# Patient Record
Sex: FEMALE | Race: WHITE | NOT HISPANIC OR LATINO | URBAN - METROPOLITAN AREA
[De-identification: names, ages, dates, MRNs, and addresses within clinical notes are randomized per-mention and may not be internally consistent; named-entity substitution may affect disease eponyms.]

---

## 2023-03-21 PROBLEM — C50.919 MALIGNANT NEOPLASM OF BREAST (MULTI): Status: ACTIVE | Noted: 2023-03-21

## 2023-03-21 PROBLEM — M15.9 GENERALIZED OSTEOARTHRITIS: Status: ACTIVE | Noted: 2023-03-21

## 2023-03-21 PROBLEM — F41.8 SITUATIONAL ANXIETY: Status: ACTIVE | Noted: 2023-03-21

## 2023-03-21 PROBLEM — B00.1 RECURRENT COLD SORES: Status: ACTIVE | Noted: 2023-03-21

## 2023-03-21 PROBLEM — K58.9 IRRITABLE BOWEL SYNDROME: Status: ACTIVE | Noted: 2023-03-21

## 2023-03-21 PROBLEM — J40 BRONCHITIS: Status: ACTIVE | Noted: 2023-03-21

## 2023-03-21 PROBLEM — M62.830 SPASM OF THORACIC BACK MUSCLE: Status: ACTIVE | Noted: 2023-03-21

## 2023-03-21 PROBLEM — R60.9 PERIPHERAL EDEMA: Status: ACTIVE | Noted: 2023-03-21

## 2023-03-21 PROBLEM — E55.9 VITAMIN D DEFICIENCY: Status: ACTIVE | Noted: 2023-03-21

## 2023-03-21 PROBLEM — E78.5 HLD (HYPERLIPIDEMIA): Status: ACTIVE | Noted: 2023-03-21

## 2023-03-21 PROBLEM — G43.809 HEADACHE, VARIANT MIGRAINE: Status: ACTIVE | Noted: 2023-03-21

## 2023-03-21 PROBLEM — R60.0 PERIPHERAL EDEMA: Status: ACTIVE | Noted: 2023-03-21

## 2023-03-21 PROBLEM — M54.50 LOW BACK PAIN: Status: ACTIVE | Noted: 2023-03-21

## 2023-03-21 PROBLEM — R00.2 HEART PALPITATIONS: Status: ACTIVE | Noted: 2023-03-21

## 2023-03-21 PROBLEM — R93.1 ELEVATED CORONARY ARTERY CALCIUM SCORE: Status: ACTIVE | Noted: 2023-03-21

## 2023-03-21 PROBLEM — L25.9 CONTACT DERMATITIS: Status: ACTIVE | Noted: 2023-03-21

## 2023-03-21 PROBLEM — L50.9 URTICARIA: Status: ACTIVE | Noted: 2023-03-21

## 2023-03-21 PROBLEM — I25.10 CAD (CORONARY ARTERY DISEASE): Status: ACTIVE | Noted: 2023-03-21

## 2023-03-21 RX ORDER — CETIRIZINE HYDROCHLORIDE 10 MG/1
10 TABLET ORAL DAILY PRN
COMMUNITY
Start: 2017-09-06 | End: 2023-04-06 | Stop reason: SDUPTHER

## 2023-03-21 RX ORDER — DIAZEPAM 5 MG/1
0.5 TABLET ORAL 2 TIMES DAILY PRN
COMMUNITY
Start: 2022-01-05 | End: 2023-04-06 | Stop reason: SDUPTHER

## 2023-03-21 RX ORDER — CHLORDIAZEPOXIDE HYDROCHLORIDE AND CLIDINIUM BROMIDE 5; 2.5 MG/1; MG/1
1-2 CAPSULE ORAL EVERY 6 HOURS PRN
COMMUNITY
Start: 2014-06-26

## 2023-03-21 RX ORDER — CYCLOBENZAPRINE HCL 10 MG
10 TABLET ORAL AS NEEDED
COMMUNITY
Start: 2021-09-22 | End: 2023-11-06 | Stop reason: SDUPTHER

## 2023-03-21 RX ORDER — ROSUVASTATIN CALCIUM 40 MG/1
1 TABLET, COATED ORAL DAILY
COMMUNITY
Start: 2020-12-03 | End: 2024-04-30 | Stop reason: SDUPTHER

## 2023-03-21 RX ORDER — ACYCLOVIR 400 MG/1
1 TABLET ORAL DAILY
COMMUNITY
Start: 2017-02-28 | End: 2023-07-31 | Stop reason: SDUPTHER

## 2023-03-21 RX ORDER — ASPIRIN 81 MG/1
1 TABLET ORAL DAILY
COMMUNITY
Start: 2021-02-09

## 2023-03-21 RX ORDER — DICLOFENAC SODIUM 75 MG/1
1 TABLET, DELAYED RELEASE ORAL DAILY
COMMUNITY
Start: 2016-03-04 | End: 2023-07-28 | Stop reason: SDUPTHER

## 2023-03-21 RX ORDER — OXYCODONE AND ACETAMINOPHEN 5; 325 MG/1; MG/1
1 TABLET ORAL EVERY 6 HOURS PRN
COMMUNITY
Start: 2018-02-19 | End: 2023-04-06 | Stop reason: ALTCHOICE

## 2023-03-21 RX ORDER — PREDNISONE 20 MG/1
TABLET ORAL
COMMUNITY
Start: 2020-10-08 | End: 2023-04-06 | Stop reason: SDUPTHER

## 2023-04-05 PROBLEM — M54.59 OTHER LOW BACK PAIN: Status: ACTIVE | Noted: 2023-04-05

## 2023-04-06 ENCOUNTER — OFFICE VISIT (OUTPATIENT)
Dept: PRIMARY CARE | Facility: CLINIC | Age: 68
End: 2023-04-06
Payer: MEDICARE

## 2023-04-06 ENCOUNTER — LAB (OUTPATIENT)
Dept: LAB | Facility: LAB | Age: 68
End: 2023-04-06
Payer: MEDICARE

## 2023-04-06 VITALS
WEIGHT: 151.4 LBS | HEART RATE: 84 BPM | OXYGEN SATURATION: 98 % | HEIGHT: 62 IN | SYSTOLIC BLOOD PRESSURE: 126 MMHG | BODY MASS INDEX: 27.86 KG/M2 | DIASTOLIC BLOOD PRESSURE: 80 MMHG

## 2023-04-06 DIAGNOSIS — Z00.00 ROUTINE GENERAL MEDICAL EXAMINATION AT A HEALTH CARE FACILITY: Primary | ICD-10-CM

## 2023-04-06 DIAGNOSIS — E55.9 VITAMIN D DEFICIENCY: ICD-10-CM

## 2023-04-06 DIAGNOSIS — Z13.0 SCREENING FOR DEFICIENCY ANEMIA: ICD-10-CM

## 2023-04-06 DIAGNOSIS — M15.9 GENERALIZED OSTEOARTHRITIS: ICD-10-CM

## 2023-04-06 DIAGNOSIS — Z13.6 ENCOUNTER FOR SCREENING FOR CARDIOVASCULAR DISORDERS: ICD-10-CM

## 2023-04-06 DIAGNOSIS — Z00.00 ROUTINE GENERAL MEDICAL EXAMINATION AT A HEALTH CARE FACILITY: ICD-10-CM

## 2023-04-06 DIAGNOSIS — C50.919 MALIGNANT NEOPLASM OF FEMALE BREAST, UNSPECIFIED ESTROGEN RECEPTOR STATUS, UNSPECIFIED LATERALITY, UNSPECIFIED SITE OF BREAST (MULTI): ICD-10-CM

## 2023-04-06 DIAGNOSIS — F41.8 SITUATIONAL ANXIETY: ICD-10-CM

## 2023-04-06 DIAGNOSIS — Z12.11 COLON CANCER SCREENING: ICD-10-CM

## 2023-04-06 DIAGNOSIS — F19.20 CONTROLLED DRUG DEPENDENCE (MULTI): ICD-10-CM

## 2023-04-06 DIAGNOSIS — Z13.29 SCREENING FOR THYROID DISORDER: ICD-10-CM

## 2023-04-06 DIAGNOSIS — I25.10 CORONARY ARTERY DISEASE, UNSPECIFIED VESSEL OR LESION TYPE, UNSPECIFIED WHETHER ANGINA PRESENT, UNSPECIFIED WHETHER NATIVE OR TRANSPLANTED HEART: ICD-10-CM

## 2023-04-06 DIAGNOSIS — E78.5 HYPERLIPIDEMIA, UNSPECIFIED HYPERLIPIDEMIA TYPE: ICD-10-CM

## 2023-04-06 PROBLEM — S93.409A SPRAIN OF ANKLE: Status: ACTIVE | Noted: 2023-04-06

## 2023-04-06 LAB
ALANINE AMINOTRANSFERASE (SGPT) (U/L) IN SER/PLAS: 58 U/L (ref 7–45)
ALBUMIN (G/DL) IN SER/PLAS: 4.1 G/DL (ref 3.4–5)
ALKALINE PHOSPHATASE (U/L) IN SER/PLAS: 83 U/L (ref 33–136)
AMPHETAMINE (PRESENCE) IN URINE BY SCREEN METHOD: ABNORMAL
ANION GAP IN SER/PLAS: 13 MMOL/L (ref 10–20)
APPEARANCE, URINE: CLEAR
ASPARTATE AMINOTRANSFERASE (SGOT) (U/L) IN SER/PLAS: 39 U/L (ref 9–39)
BARBITURATES PRESENCE IN URINE BY SCREEN METHOD: ABNORMAL
BENZODIAZEPINE (PRESENCE) IN URINE BY SCREEN METHOD: ABNORMAL
BILIRUBIN TOTAL (MG/DL) IN SER/PLAS: 0.5 MG/DL (ref 0–1.2)
BILIRUBIN, URINE: NEGATIVE
BLOOD, URINE: ABNORMAL
CALCIDIOL (25 OH VITAMIN D3) (NG/ML) IN SER/PLAS: 47 NG/ML
CALCIUM (MG/DL) IN SER/PLAS: 9.1 MG/DL (ref 8.6–10.6)
CANNABINOIDS IN URINE BY SCREEN METHOD: ABNORMAL
CARBON DIOXIDE, TOTAL (MMOL/L) IN SER/PLAS: 27 MMOL/L (ref 21–32)
CHLORIDE (MMOL/L) IN SER/PLAS: 108 MMOL/L (ref 98–107)
COBALAMIN (VITAMIN B12) (PG/ML) IN SER/PLAS: 268 PG/ML (ref 211–911)
COCAINE (PRESENCE) IN URINE BY SCREEN METHOD: ABNORMAL
COLOR, URINE: YELLOW
CREATININE (MG/DL) IN SER/PLAS: 0.61 MG/DL (ref 0.5–1.05)
DRUG SCREEN COMMENT URINE: ABNORMAL
ERYTHROCYTE DISTRIBUTION WIDTH (RATIO) BY AUTOMATED COUNT: 13.2 % (ref 11.5–14.5)
ERYTHROCYTE MEAN CORPUSCULAR HEMOGLOBIN CONCENTRATION (G/DL) BY AUTOMATED: 32.5 G/DL (ref 32–36)
ERYTHROCYTE MEAN CORPUSCULAR VOLUME (FL) BY AUTOMATED COUNT: 94 FL (ref 80–100)
ERYTHROCYTES (10*6/UL) IN BLOOD BY AUTOMATED COUNT: 4.54 X10E12/L (ref 4–5.2)
FENTANYL URINE: ABNORMAL
GFR FEMALE: >90 ML/MIN/1.73M2
GLUCOSE (MG/DL) IN SER/PLAS: 82 MG/DL (ref 74–99)
GLUCOSE, URINE: NEGATIVE MG/DL
HEMATOCRIT (%) IN BLOOD BY AUTOMATED COUNT: 42.5 % (ref 36–46)
HEMOGLOBIN (G/DL) IN BLOOD: 13.8 G/DL (ref 12–16)
KETONES, URINE: NEGATIVE MG/DL
LEUKOCYTE ESTERASE, URINE: NEGATIVE
LEUKOCYTES (10*3/UL) IN BLOOD BY AUTOMATED COUNT: 6 X10E9/L (ref 4.4–11.3)
METHADONE (PRESENCE) IN URINE BY SCREEN METHOD: ABNORMAL
NITRITE, URINE: NEGATIVE
NRBC (PER 100 WBCS) BY AUTOMATED COUNT: 0 /100 WBC (ref 0–0)
OPIATES (PRESENCE) IN URINE BY SCREEN METHOD: ABNORMAL
OXYCODONE (PRESENCE) IN URINE BY SCREEN METHOD: ABNORMAL
PH, URINE: 6 (ref 5–8)
PHENCYCLIDINE (PRESENCE) IN URINE BY SCREEN METHOD: ABNORMAL
PLATELETS (10*3/UL) IN BLOOD AUTOMATED COUNT: 187 X10E9/L (ref 150–450)
POTASSIUM (MMOL/L) IN SER/PLAS: 3.6 MMOL/L (ref 3.5–5.3)
PROTEIN TOTAL: 6.1 G/DL (ref 6.4–8.2)
PROTEIN, URINE: NEGATIVE MG/DL
RBC, URINE: 3 /HPF (ref 0–5)
SODIUM (MMOL/L) IN SER/PLAS: 144 MMOL/L (ref 136–145)
SPECIFIC GRAVITY, URINE: 1.01 (ref 1–1.03)
SQUAMOUS EPITHELIAL CELLS, URINE: <1 /HPF
THYROTROPIN (MIU/L) IN SER/PLAS BY DETECTION LIMIT <= 0.05 MIU/L: 1.91 MIU/L (ref 0.44–3.98)
UREA NITROGEN (MG/DL) IN SER/PLAS: 7 MG/DL (ref 6–23)
UROBILINOGEN, URINE: <2 MG/DL (ref 0–1.9)
WBC, URINE: 2 /HPF (ref 0–5)

## 2023-04-06 PROCEDURE — 81001 URINALYSIS AUTO W/SCOPE: CPT

## 2023-04-06 PROCEDURE — 36415 COLL VENOUS BLD VENIPUNCTURE: CPT

## 2023-04-06 PROCEDURE — 84443 ASSAY THYROID STIM HORMONE: CPT

## 2023-04-06 PROCEDURE — 1159F MED LIST DOCD IN RCRD: CPT | Performed by: STUDENT IN AN ORGANIZED HEALTH CARE EDUCATION/TRAINING PROGRAM

## 2023-04-06 PROCEDURE — 80346 BENZODIAZEPINES1-12: CPT

## 2023-04-06 PROCEDURE — 82607 VITAMIN B-12: CPT

## 2023-04-06 PROCEDURE — 80053 COMPREHEN METABOLIC PANEL: CPT

## 2023-04-06 PROCEDURE — 1170F FXNL STATUS ASSESSED: CPT | Performed by: STUDENT IN AN ORGANIZED HEALTH CARE EDUCATION/TRAINING PROGRAM

## 2023-04-06 PROCEDURE — 85027 COMPLETE CBC AUTOMATED: CPT

## 2023-04-06 PROCEDURE — 1160F RVW MEDS BY RX/DR IN RCRD: CPT | Performed by: STUDENT IN AN ORGANIZED HEALTH CARE EDUCATION/TRAINING PROGRAM

## 2023-04-06 PROCEDURE — 80307 DRUG TEST PRSMV CHEM ANLYZR: CPT

## 2023-04-06 PROCEDURE — 82306 VITAMIN D 25 HYDROXY: CPT

## 2023-04-06 PROCEDURE — G0439 PPPS, SUBSEQ VISIT: HCPCS | Performed by: STUDENT IN AN ORGANIZED HEALTH CARE EDUCATION/TRAINING PROGRAM

## 2023-04-06 RX ORDER — DIAZEPAM 5 MG/1
2.5 TABLET ORAL EVERY 12 HOURS PRN
Qty: 60 TABLET | Refills: 0 | Status: SHIPPED | OUTPATIENT
Start: 2023-04-06 | End: 2024-04-09

## 2023-04-06 RX ORDER — CETIRIZINE HYDROCHLORIDE 10 MG/1
10 TABLET ORAL DAILY
COMMUNITY

## 2023-04-06 RX ORDER — HYDROCODONE BITARTRATE AND ACETAMINOPHEN 5; 325 MG/1; MG/1
TABLET ORAL EVERY 6 HOURS
COMMUNITY
End: 2023-04-06 | Stop reason: ALTCHOICE

## 2023-04-06 RX ORDER — LIDOCAINE HYDROCHLORIDE 20 MG/ML
SOLUTION OROPHARYNGEAL EVERY 4 HOURS PRN
COMMUNITY
Start: 2016-03-21 | End: 2023-04-06 | Stop reason: ALTCHOICE

## 2023-04-06 RX ORDER — METHYLPREDNISOLONE 4 MG/1
TABLET ORAL
COMMUNITY
End: 2023-04-06 | Stop reason: ALTCHOICE

## 2023-04-06 RX ORDER — ACYCLOVIR 50 MG/G
OINTMENT TOPICAL
COMMUNITY
End: 2023-04-06 | Stop reason: ALTCHOICE

## 2023-04-06 RX ORDER — PREDNISONE 20 MG/1
20 TABLET ORAL DAILY
Qty: 21 TABLET | Refills: 1 | Status: SHIPPED | OUTPATIENT
Start: 2023-04-06 | End: 2023-04-06 | Stop reason: ALTCHOICE

## 2023-04-06 ASSESSMENT — ENCOUNTER SYMPTOMS
OCCASIONAL FEELINGS OF UNSTEADINESS: 0
LOSS OF SENSATION IN FEET: 0
DEPRESSION: 0

## 2023-04-06 ASSESSMENT — ACTIVITIES OF DAILY LIVING (ADL)
MANAGING_FINANCES: INDEPENDENT
DOING_HOUSEWORK: INDEPENDENT
BATHING: INDEPENDENT
DRESSING: INDEPENDENT
GROCERY_SHOPPING: INDEPENDENT
TAKING_MEDICATION: INDEPENDENT

## 2023-04-06 ASSESSMENT — PATIENT HEALTH QUESTIONNAIRE - PHQ9
2. FEELING DOWN, DEPRESSED OR HOPELESS: NOT AT ALL
SUM OF ALL RESPONSES TO PHQ9 QUESTIONS 1 AND 2: 0
1. LITTLE INTEREST OR PLEASURE IN DOING THINGS: NOT AT ALL

## 2023-04-06 NOTE — PATIENT INSTRUCTIONS
1.  Medicare wellness.  No concerns on exam.  Screening labs ordered today.  Up-to-date with mammogram.  We do advise on colon cancer screening Cologuard ordered today.  Defers on vaccinations at this time.    2.  Anxiety.  She rarely needs diazepam.  Patient agreement forms and drug screen ordered today.  OARRS report reviewed.  You have been prescribed a schedule 4 controlled substance, a benzodiazepine, for maintenance of anxiety. The longer term use of these medications for anxiety maintenance is both frowned upon and addicting. However in some cases it is needed. By law, you can be prescribed a 90 day supply with 1 refill. After that if refills are needed there will be a required office visit, there will be no exceptions. You will be drug tested on a regular basis. A prescriber report will be ran each time you need a refill. You may not use any illegal drugs, and may not get controlled medications prescribed from more than 1 doctor. If there are any abnormalities on your drug testing, and if any other physicians are found to have been prescribing you controlled substances, our office will no longer help with refills in the future. There will be no exceptions. It is your responsibility to take your medications properly, it is your responsibility to make office visits in advance when refills may be needed, and it is your responsibility to not take any illegal drugs, as well as any controlled medications from another doctor. If you fail in your responsibilities, our office will discontinue your medication with no questions and no exceptions.  If you feel that you are addicted to your medication and would like to improve yourself and cut back, you may contact the [place] Department of [facility], Their number [phone] toll-free. Your call is confidential. They will refer you to someone for help. You can call them from Monday through Friday between 8 a.m. and 5 p.m.

## 2023-04-06 NOTE — PROGRESS NOTES
"Subjective   Patient ID: Vida Jose is a 67 y.o. female who presents for Medicare Annual Wellness Visit Subsequent (She is not fasting.) and Med Refill.    HPI she is here for Medicare wellness visit.  Due for blood work.  Needs med refill.  She rarely uses diazepam.    Review of Systems  Constitutional: NO F, chills, or sweats  Eyes: no blurred vision or visual disturbance  ENT: no hearing loss, no congestion, no nasal discharge, no hoarseness and no sore throat.   Cardiovascular: no chest pain, no edema, no palps and no syncope.   Respiratory: no cough,no s.o.b. and no wheezing  Gastrointestinal: no abdominal pain, No C/D no N/V, no blood in stools  Genitourinary: no dysuria, no change in urinary frequency, no urinary hesitancy and no feelings of urinary urgency.   Musculoskeletal: no arthralgias,  no back pain and no myalgias.   Integumentary: no new skin lesions and no rashes.   Neurological: no difficulty walking, no headache, no limb weakness, no numbness and no tingling.   Psychiatric: Rare situational anxiety  Endocrine: no recent weight gain and no recent weight loss.   Hematologic/Lymphatic: no tendency for easy bruising and no swollen glands.  Objective   /80 (BP Location: Left arm, Patient Position: Sitting, BP Cuff Size: Adult)   Pulse 84   Ht 1.577 m (5' 2.09\")   Wt 68.7 kg (151 lb 6.4 oz)   SpO2 98%   BMI 27.61 kg/m²     Physical Exam  gen- a & o x 3, nad, pleasant  heent- eomi, perrla, ear canals patent, TM's non-erythematous, no fluid, frontal and maxillary sinus's nontender  neck- supple, nontender, no palpable or enlarged nodes, no thyromegaly  heart- rrr, no murmurs  lungs- cta b/l , no w/r/r  chest- symmetric, nontender  ab- soft, nontender, no palpable organomegaly, postive bowel sounds  ex's- no c/c/e  neuro- CNs 2-12 grossly intact, full sensation and strength in all extremities    Assessment/Plan     1.  Medicare wellness.  No concerns on exam.  Screening labs ordered " today.  Up-to-date with mammogram.  We do advise on colon cancer screening Cologuard ordered today.  Defers on vaccinations at this time.    2.  Anxiety.  She rarely needs diazepam.  Patient agreement forms and drug screen ordered today.  OARRS report reviewed.  You have been prescribed a schedule 4 controlled substance, a benzodiazepine, for maintenance of anxiety. The longer term use of these medications for anxiety maintenance is both frowned upon and addicting. However in some cases it is needed. By law, you can be prescribed a 90 day supply with 1 refill. After that if refills are needed there will be a required office visit, there will be no exceptions. You will be drug tested on a regular basis. A prescriber report will be ran each time you need a refill. You may not use any illegal drugs, and may not get controlled medications prescribed from more than 1 doctor. If there are any abnormalities on your drug testing, and if any other physicians are found to have been prescribing you controlled substances, our office will no longer help with refills in the future. There will be no exceptions. It is your responsibility to take your medications properly, it is your responsibility to make office visits in advance when refills may be needed, and it is your responsibility to not take any illegal drugs, as well as any controlled medications from another doctor. If you fail in your responsibilities, our office will discontinue your medication with no questions and no exceptions.  If you feel that you are addicted to your medication and would like to improve yourself and cut back, you may contact the [place] [facility], Their number [phone] toll-free. Your call is confidential. They will refer you to someone for help. You can call them from Monday through Friday between 8 a.m. and 5 p.m.

## 2023-04-10 ENCOUNTER — TELEPHONE (OUTPATIENT)
Dept: PRIMARY CARE | Facility: CLINIC | Age: 68
End: 2023-04-10
Payer: MEDICARE

## 2023-04-10 DIAGNOSIS — R21 RASH: Primary | ICD-10-CM

## 2023-04-10 RX ORDER — PREDNISONE 20 MG/1
TABLET ORAL
Qty: 18 TABLET | Refills: 0 | Status: SHIPPED | OUTPATIENT
Start: 2023-04-10 | End: 2023-05-16 | Stop reason: ALTCHOICE

## 2023-04-10 NOTE — TELEPHONE ENCOUNTER
Patient called requesting a refill on her prednisone 20mg. She is going out of town and needs it for a heat rash. If you can please send it to the pharmacy on file.

## 2023-04-11 LAB
7-AMINOCLONAZEPAM: <25 NG/ML
ALPHA-HYDROXYALPRAZOLAM: <25 NG/ML
ALPHA-HYDROXYMIDAZOLAM: <25 NG/ML
ALPRAZOLAM: <25 NG/ML
CHLORDIAZEPOXIDE: <25 NG/ML
CLONAZEPAM: <25 NG/ML
DIAZEPAM: <25 NG/ML
LORAZEPAM: <25 NG/ML
MIDAZOLAM: <25 NG/ML
NORDIAZEPAM: <25 NG/ML
OXAZEPAM: 111 NG/ML
TEMAZEPAM: <25 NG/ML

## 2023-04-11 NOTE — TELEPHONE ENCOUNTER
Patient called requesting a refill on her prednisone 20mg. She is going out of town and needs it for a heat rash. If you can please send it to the pharmacy on file. She was told she accidentally canceled her order. If you can please send it again to the pharmacy.

## 2023-05-16 ENCOUNTER — TELEPHONE (OUTPATIENT)
Dept: PRIMARY CARE | Facility: CLINIC | Age: 68
End: 2023-05-16

## 2023-05-16 ENCOUNTER — OFFICE VISIT (OUTPATIENT)
Dept: PRIMARY CARE | Facility: CLINIC | Age: 68
End: 2023-05-16
Payer: MEDICARE

## 2023-05-16 VITALS
OXYGEN SATURATION: 99 % | BODY MASS INDEX: 27.29 KG/M2 | WEIGHT: 149.6 LBS | DIASTOLIC BLOOD PRESSURE: 72 MMHG | HEART RATE: 81 BPM | SYSTOLIC BLOOD PRESSURE: 106 MMHG

## 2023-05-16 DIAGNOSIS — G43.909 MIGRAINE WITHOUT STATUS MIGRAINOSUS, NOT INTRACTABLE, UNSPECIFIED MIGRAINE TYPE: Primary | ICD-10-CM

## 2023-05-16 DIAGNOSIS — R21 RASH: ICD-10-CM

## 2023-05-16 DIAGNOSIS — G43.909 MIGRAINE WITHOUT STATUS MIGRAINOSUS, NOT INTRACTABLE, UNSPECIFIED MIGRAINE TYPE: ICD-10-CM

## 2023-05-16 PROBLEM — M79.671 PAIN IN RIGHT FOOT: Status: ACTIVE | Noted: 2018-01-18

## 2023-05-16 PROBLEM — Z98.890 S/P FOOT SURGERY: Status: ACTIVE | Noted: 2018-03-21

## 2023-05-16 PROBLEM — M21.629 TAILOR'S BUNION: Status: ACTIVE | Noted: 2017-03-02

## 2023-05-16 PROBLEM — C50.919 BREAST CA (MULTI): Status: ACTIVE | Noted: 2023-05-16

## 2023-05-16 PROBLEM — M54.9 BACK PAIN: Status: ACTIVE | Noted: 2023-05-16

## 2023-05-16 PROBLEM — M20.11 ACQUIRED HALLUX VALGUS OF RIGHT FOOT: Status: ACTIVE | Noted: 2017-03-02

## 2023-05-16 PROCEDURE — 1159F MED LIST DOCD IN RCRD: CPT | Performed by: STUDENT IN AN ORGANIZED HEALTH CARE EDUCATION/TRAINING PROGRAM

## 2023-05-16 PROCEDURE — 1160F RVW MEDS BY RX/DR IN RCRD: CPT | Performed by: STUDENT IN AN ORGANIZED HEALTH CARE EDUCATION/TRAINING PROGRAM

## 2023-05-16 PROCEDURE — 99213 OFFICE O/P EST LOW 20 MIN: CPT | Performed by: STUDENT IN AN ORGANIZED HEALTH CARE EDUCATION/TRAINING PROGRAM

## 2023-05-16 RX ORDER — OXYCODONE AND ACETAMINOPHEN 5; 325 MG/1; MG/1
1 TABLET ORAL AS NEEDED
COMMUNITY
End: 2023-05-16 | Stop reason: SDUPTHER

## 2023-05-16 RX ORDER — PREDNISONE 20 MG/1
TABLET ORAL
Qty: 18 TABLET | Refills: 0 | Status: SHIPPED | OUTPATIENT
Start: 2023-05-16 | End: 2023-10-19 | Stop reason: SDUPTHER

## 2023-05-16 RX ORDER — OXYCODONE AND ACETAMINOPHEN 5; 325 MG/1; MG/1
1 TABLET ORAL AS NEEDED
Qty: 10 TABLET | Refills: 0 | Status: SHIPPED | OUTPATIENT
Start: 2023-05-16 | End: 2023-05-17 | Stop reason: SDUPTHER

## 2023-05-16 NOTE — PATIENT INSTRUCTIONS
1.  She has a history of urticaria related to prolonged sun exposure.  Had an episode that was fairly severe while in [place].  She became dehydrated and needed IV fluids in [place].  Still has dry rash or urticarial throughout the body mostly on the lower extremities.  Discussed round of prednisone today.  Over-the-counter topical moisturizer such as Eucerin cream.    2.  For severe headaches uses oxycodone.  Needs approximately 1 pill/month.  Med refill today

## 2023-05-16 NOTE — TELEPHONE ENCOUNTER
Pharmacy left a VM stating the prescription for Percocet does not have no frequency in the directions and the QTY. Needs new prescription be sent to the pharmacy.

## 2023-05-16 NOTE — PROGRESS NOTES
Subjective   Patient ID: Vida Jose is a 67 y.o. female who presents for Rash (Rash on chest.), Fatigue, and Med Refill.    \[facility] [place] [facility]\"" follows up after being in [place].  She has a history of severe urticarial reactions to sunlight exposure.  Accidentally she forgot sunscreen and spent long hours in the sun.  She has severe rash mostly lower extremities at first became very dehydrated required going to an ER in [place] for IV fluids.  Comes in for follow-up.    Review of Systems  Constitutional: NO F, chills, or sweats  Eyes: no blurred vision or visual disturbance  ENT: no hearing loss, no congestion, no nasal discharge, no hoarseness and no sore throat.   Cardiovascular: no chest pain, no edema, no palps and no syncope.   Respiratory: no cough,no s.o.b. and no wheezing  Gastrointestinal: no abdominal pain, No C/D no N/V, no blood in stools  Genitourinary: no dysuria, no change in urinary frequency, no urinary hesitancy and no feelings of urinary urgency.   Musculoskeletal: no arthralgias,  no back pain and no myalgias.   Integumentary: Skin rash as per HPI  Objective   /72 (BP Location: Left arm, Patient Position: Sitting, BP Cuff Size: Adult)   Pulse 81   Wt 67.9 kg (149 lb 9.6 oz)   SpO2 99%   BMI 27.29 kg/m²     Physical Exam  gen- a & o x 3, nad, pleasant    skin-dry scaly urticarial patches throughout the lower extremities, no signs of infection  Assessment/Plan     1.  She has a history of urticaria related to prolonged sun exposure.  Had an episode that was fairly severe while in [place].  She became dehydrated and needed IV fluids in [place].  Still has dry rash or urticarial throughout the body mostly on the lower extremities.  Discussed round of prednisone today.  Over-the-counter topical moisturizer such as Eucerin cream.    2.  For severe headaches uses oxycodone.  Needs approximately 1 pill/month.  Med refill today

## 2023-05-17 RX ORDER — OXYCODONE AND ACETAMINOPHEN 5; 325 MG/1; MG/1
1 TABLET ORAL EVERY 8 HOURS PRN
Qty: 10 TABLET | Refills: 0 | Status: SHIPPED | OUTPATIENT
Start: 2023-05-17 | End: 2023-11-06 | Stop reason: SDUPTHER

## 2023-07-28 DIAGNOSIS — M54.9 BACK PAIN, UNSPECIFIED BACK LOCATION, UNSPECIFIED BACK PAIN LATERALITY, UNSPECIFIED CHRONICITY: Primary | ICD-10-CM

## 2023-07-28 RX ORDER — DICLOFENAC SODIUM 75 MG/1
75 TABLET, DELAYED RELEASE ORAL DAILY
Qty: 90 TABLET | Refills: 3 | Status: SHIPPED | OUTPATIENT
Start: 2023-07-28

## 2023-07-31 DIAGNOSIS — B00.1 RECURRENT COLD SORES: ICD-10-CM

## 2023-07-31 RX ORDER — ACYCLOVIR 400 MG/1
400 TABLET ORAL DAILY
Qty: 15 TABLET | Refills: 1 | Status: SHIPPED | OUTPATIENT
Start: 2023-07-31 | End: 2023-10-05 | Stop reason: SDUPTHER

## 2023-08-28 ENCOUNTER — TELEPHONE (OUTPATIENT)
Dept: PRIMARY CARE | Facility: CLINIC | Age: 68
End: 2023-08-28
Payer: MEDICARE

## 2023-08-28 DIAGNOSIS — J06.9 URI, ACUTE: Primary | ICD-10-CM

## 2023-08-28 RX ORDER — AZITHROMYCIN 250 MG/1
TABLET, FILM COATED ORAL
Qty: 6 TABLET | Refills: 0 | Status: SHIPPED | OUTPATIENT
Start: 2023-08-28 | End: 2023-09-01

## 2023-10-05 DIAGNOSIS — B00.1 RECURRENT COLD SORES: ICD-10-CM

## 2023-10-05 RX ORDER — ACYCLOVIR 400 MG/1
400 TABLET ORAL DAILY
Qty: 15 TABLET | Refills: 1 | Status: SHIPPED | OUTPATIENT
Start: 2023-10-05 | End: 2023-11-08 | Stop reason: SDUPTHER

## 2023-10-19 ENCOUNTER — TELEPHONE (OUTPATIENT)
Dept: PRIMARY CARE | Facility: CLINIC | Age: 68
End: 2023-10-19
Payer: MEDICARE

## 2023-10-19 DIAGNOSIS — R21 RASH: ICD-10-CM

## 2023-10-19 RX ORDER — PREDNISONE 20 MG/1
TABLET ORAL
Qty: 18 TABLET | Refills: 0 | Status: SHIPPED | OUTPATIENT
Start: 2023-10-19 | End: 2024-02-16 | Stop reason: SDUPTHER

## 2023-10-19 NOTE — TELEPHONE ENCOUNTER
Patient left a VM requesting a refill for prednisone to be sent to [facility] pharmacy on file.

## 2023-11-06 ENCOUNTER — TELEPHONE (OUTPATIENT)
Dept: PRIMARY CARE | Facility: CLINIC | Age: 68
End: 2023-11-06
Payer: MEDICARE

## 2023-11-06 DIAGNOSIS — G43.909 MIGRAINE WITHOUT STATUS MIGRAINOSUS, NOT INTRACTABLE, UNSPECIFIED MIGRAINE TYPE: ICD-10-CM

## 2023-11-06 DIAGNOSIS — J06.9 URI, ACUTE: ICD-10-CM

## 2023-11-06 DIAGNOSIS — M15.9 GENERALIZED OSTEOARTHRITIS: Primary | ICD-10-CM

## 2023-11-06 RX ORDER — OXYCODONE AND ACETAMINOPHEN 5; 325 MG/1; MG/1
1 TABLET ORAL EVERY 8 HOURS PRN
Qty: 10 TABLET | Refills: 0 | Status: SHIPPED | OUTPATIENT
Start: 2023-11-06 | End: 2024-02-16 | Stop reason: SDUPTHER

## 2023-11-06 RX ORDER — BENZONATATE 200 MG/1
200 CAPSULE ORAL 3 TIMES DAILY PRN
Qty: 45 CAPSULE | Refills: 0 | Status: SHIPPED | OUTPATIENT
Start: 2023-11-06 | End: 2023-11-21

## 2023-11-06 RX ORDER — AZITHROMYCIN 250 MG/1
TABLET, FILM COATED ORAL
Qty: 6 TABLET | Refills: 0 | Status: SHIPPED | OUTPATIENT
Start: 2023-11-06 | End: 2023-11-17 | Stop reason: ALTCHOICE

## 2023-11-06 RX ORDER — CYCLOBENZAPRINE HCL 10 MG
10 TABLET ORAL AS NEEDED
Qty: 30 TABLET | Refills: 2 | Status: SHIPPED | OUTPATIENT
Start: 2023-11-06 | End: 2024-11-05

## 2023-11-06 RX ORDER — METHYLPREDNISOLONE 4 MG/1
TABLET ORAL
Qty: 21 TABLET | Refills: 0 | Status: SHIPPED | OUTPATIENT
Start: 2023-11-06 | End: 2024-02-16 | Stop reason: ALTCHOICE

## 2023-11-06 NOTE — TELEPHONE ENCOUNTER
Patient believes she has bronchitis/her bronchitis may have came back. She cannot stop coughing and her chest feels heavy. Wants to know if you can send in anything to help or what would you suggest?    Also requesting refills for Cyclobenzaprine 10mg and Oxycodone to her [facility] pharmacy

## 2023-11-08 DIAGNOSIS — B00.1 RECURRENT COLD SORES: ICD-10-CM

## 2023-11-08 RX ORDER — ACYCLOVIR 400 MG/1
400 TABLET ORAL DAILY
Qty: 30 TABLET | Refills: 6 | Status: SHIPPED | OUTPATIENT
Start: 2023-11-08 | End: 2024-05-29 | Stop reason: SDUPTHER

## 2023-11-17 ENCOUNTER — TELEPHONE (OUTPATIENT)
Dept: PRIMARY CARE | Facility: CLINIC | Age: 68
End: 2023-11-17
Payer: MEDICARE

## 2023-11-17 DIAGNOSIS — J06.9 URI, ACUTE: Primary | ICD-10-CM

## 2023-11-17 RX ORDER — CLARITHROMYCIN 500 MG/1
500 TABLET, FILM COATED ORAL 2 TIMES DAILY
Qty: 20 TABLET | Refills: 0 | Status: SHIPPED | OUTPATIENT
Start: 2023-11-17 | End: 2023-11-27

## 2023-11-17 NOTE — TELEPHONE ENCOUNTER
Vida states you prescribed her mes to help with her bronchitis symptoms, bad cough, heavy chest and sore throat. She stated she finished the abx but is not feeling any better. Would like to know if you can send in a different prescription to help her out

## 2024-01-26 ENCOUNTER — TELEPHONE (OUTPATIENT)
Dept: PRIMARY CARE | Facility: CLINIC | Age: 69
End: 2024-01-26
Payer: MEDICARE

## 2024-01-26 DIAGNOSIS — Z13.29 SCREENING FOR THYROID DISORDER: ICD-10-CM

## 2024-01-26 DIAGNOSIS — Z13.6 ENCOUNTER FOR SCREENING FOR CARDIOVASCULAR DISORDERS: ICD-10-CM

## 2024-01-26 DIAGNOSIS — I25.10 CORONARY ARTERY DISEASE, UNSPECIFIED VESSEL OR LESION TYPE, UNSPECIFIED WHETHER ANGINA PRESENT, UNSPECIFIED WHETHER NATIVE OR TRANSPLANTED HEART: ICD-10-CM

## 2024-01-26 DIAGNOSIS — E55.9 VITAMIN D DEFICIENCY: ICD-10-CM

## 2024-01-26 DIAGNOSIS — Z12.31 ENCOUNTER FOR SCREENING MAMMOGRAM FOR MALIGNANT NEOPLASM OF BREAST: ICD-10-CM

## 2024-01-26 DIAGNOSIS — Z13.0 SCREENING FOR DEFICIENCY ANEMIA: ICD-10-CM

## 2024-01-26 DIAGNOSIS — E78.5 HYPERLIPIDEMIA, UNSPECIFIED HYPERLIPIDEMIA TYPE: ICD-10-CM

## 2024-01-26 DIAGNOSIS — Z00.00 WELLNESS EXAMINATION: Primary | ICD-10-CM

## 2024-01-26 NOTE — TELEPHONE ENCOUNTER
Vida has an upcoming wellness visit in April and wants to know if you can order her routine blood work and mammogram so she can get it done before. She stated her cardiologist would also like to review her blood results when they come in

## 2024-02-12 ENCOUNTER — LAB (OUTPATIENT)
Dept: LAB | Facility: LAB | Age: 69
End: 2024-02-12
Payer: MEDICARE

## 2024-02-12 ENCOUNTER — HOSPITAL ENCOUNTER (OUTPATIENT)
Dept: RADIOLOGY | Facility: CLINIC | Age: 69
Discharge: HOME | End: 2024-02-12
Payer: MEDICARE

## 2024-02-12 VITALS — BODY MASS INDEX: 25.34 KG/M2 | HEIGHT: 63 IN | WEIGHT: 143 LBS

## 2024-02-12 DIAGNOSIS — Z00.00 WELLNESS EXAMINATION: ICD-10-CM

## 2024-02-12 DIAGNOSIS — Z13.0 SCREENING FOR DEFICIENCY ANEMIA: ICD-10-CM

## 2024-02-12 DIAGNOSIS — E78.5 HYPERLIPIDEMIA, UNSPECIFIED HYPERLIPIDEMIA TYPE: ICD-10-CM

## 2024-02-12 DIAGNOSIS — Z12.31 ENCOUNTER FOR SCREENING MAMMOGRAM FOR MALIGNANT NEOPLASM OF BREAST: ICD-10-CM

## 2024-02-12 DIAGNOSIS — I25.10 CORONARY ARTERY DISEASE, UNSPECIFIED VESSEL OR LESION TYPE, UNSPECIFIED WHETHER ANGINA PRESENT, UNSPECIFIED WHETHER NATIVE OR TRANSPLANTED HEART: ICD-10-CM

## 2024-02-12 DIAGNOSIS — E55.9 VITAMIN D DEFICIENCY: ICD-10-CM

## 2024-02-12 DIAGNOSIS — Z13.6 ENCOUNTER FOR SCREENING FOR CARDIOVASCULAR DISORDERS: ICD-10-CM

## 2024-02-12 DIAGNOSIS — Z13.29 SCREENING FOR THYROID DISORDER: ICD-10-CM

## 2024-02-12 LAB
25(OH)D3 SERPL-MCNC: 61 NG/ML (ref 30–100)
ALBUMIN SERPL BCP-MCNC: 4.2 G/DL (ref 3.4–5)
ALP SERPL-CCNC: 77 U/L (ref 33–136)
ALT SERPL W P-5'-P-CCNC: 16 U/L (ref 7–45)
ANION GAP SERPL CALC-SCNC: 12 MMOL/L (ref 10–20)
APPEARANCE UR: CLEAR
AST SERPL W P-5'-P-CCNC: 17 U/L (ref 9–39)
BACTERIA #/AREA URNS AUTO: ABNORMAL /HPF
BASOPHILS # BLD AUTO: 0.07 X10*3/UL (ref 0–0.1)
BASOPHILS NFR BLD AUTO: 0.9 %
BILIRUB SERPL-MCNC: 0.5 MG/DL (ref 0–1.2)
BILIRUB UR STRIP.AUTO-MCNC: NEGATIVE MG/DL
BUN SERPL-MCNC: 17 MG/DL (ref 6–23)
CALCIUM SERPL-MCNC: 9.1 MG/DL (ref 8.6–10.6)
CHLORIDE SERPL-SCNC: 107 MMOL/L (ref 98–107)
CHOLEST SERPL-MCNC: 120 MG/DL (ref 0–199)
CHOLESTEROL/HDL RATIO: 2.4
CO2 SERPL-SCNC: 26 MMOL/L (ref 21–32)
COLOR UR: ABNORMAL
CREAT SERPL-MCNC: 0.79 MG/DL (ref 0.5–1.05)
EGFRCR SERPLBLD CKD-EPI 2021: 81 ML/MIN/1.73M*2
EOSINOPHIL # BLD AUTO: 0.11 X10*3/UL (ref 0–0.7)
EOSINOPHIL NFR BLD AUTO: 1.4 %
ERYTHROCYTE [DISTWIDTH] IN BLOOD BY AUTOMATED COUNT: 13 % (ref 11.5–14.5)
GLUCOSE SERPL-MCNC: 83 MG/DL (ref 74–99)
GLUCOSE UR STRIP.AUTO-MCNC: NORMAL MG/DL
HCT VFR BLD AUTO: 44.1 % (ref 36–46)
HDLC SERPL-MCNC: 50.8 MG/DL
HGB BLD-MCNC: 14.9 G/DL (ref 12–16)
IMM GRANULOCYTES # BLD AUTO: 0.02 X10*3/UL (ref 0–0.7)
IMM GRANULOCYTES NFR BLD AUTO: 0.3 % (ref 0–0.9)
KETONES UR STRIP.AUTO-MCNC: NEGATIVE MG/DL
LDLC SERPL CALC-MCNC: 45 MG/DL
LEUKOCYTE ESTERASE UR QL STRIP.AUTO: ABNORMAL
LYMPHOCYTES # BLD AUTO: 2.46 X10*3/UL (ref 1.2–4.8)
LYMPHOCYTES NFR BLD AUTO: 30.9 %
MCH RBC QN AUTO: 31.2 PG (ref 26–34)
MCHC RBC AUTO-ENTMCNC: 33.8 G/DL (ref 32–36)
MCV RBC AUTO: 93 FL (ref 80–100)
MONOCYTES # BLD AUTO: 0.4 X10*3/UL (ref 0.1–1)
MONOCYTES NFR BLD AUTO: 5 %
MUCOUS THREADS #/AREA URNS AUTO: ABNORMAL /LPF
NEUTROPHILS # BLD AUTO: 4.91 X10*3/UL (ref 1.2–7.7)
NEUTROPHILS NFR BLD AUTO: 61.5 %
NITRITE UR QL STRIP.AUTO: NEGATIVE
NON HDL CHOLESTEROL: 69 MG/DL (ref 0–149)
NRBC BLD-RTO: 0 /100 WBCS (ref 0–0)
PH UR STRIP.AUTO: 5.5 [PH]
PLATELET # BLD AUTO: 201 X10*3/UL (ref 150–450)
POTASSIUM SERPL-SCNC: 3.6 MMOL/L (ref 3.5–5.3)
PROT SERPL-MCNC: 6.5 G/DL (ref 6.4–8.2)
PROT UR STRIP.AUTO-MCNC: ABNORMAL MG/DL
RBC # BLD AUTO: 4.77 X10*6/UL (ref 4–5.2)
RBC # UR STRIP.AUTO: NEGATIVE /UL
RBC #/AREA URNS AUTO: ABNORMAL /HPF
SODIUM SERPL-SCNC: 141 MMOL/L (ref 136–145)
SP GR UR STRIP.AUTO: 1.02
TRIGL SERPL-MCNC: 119 MG/DL (ref 0–149)
TSH SERPL-ACNC: 2.01 MIU/L (ref 0.44–3.98)
UROBILINOGEN UR STRIP.AUTO-MCNC: NORMAL MG/DL
VIT B12 SERPL-MCNC: 269 PG/ML (ref 211–911)
VLDL: 24 MG/DL (ref 0–40)
WBC # BLD AUTO: 8 X10*3/UL (ref 4.4–11.3)
WBC #/AREA URNS AUTO: ABNORMAL /HPF

## 2024-02-12 PROCEDURE — 85025 COMPLETE CBC W/AUTO DIFF WBC: CPT

## 2024-02-12 PROCEDURE — 80053 COMPREHEN METABOLIC PANEL: CPT

## 2024-02-12 PROCEDURE — 77067 SCR MAMMO BI INCL CAD: CPT | Mod: LT

## 2024-02-12 PROCEDURE — 77067 SCR MAMMO BI INCL CAD: CPT | Mod: LEFT SIDE | Performed by: RADIOLOGY

## 2024-02-12 PROCEDURE — 82306 VITAMIN D 25 HYDROXY: CPT

## 2024-02-12 PROCEDURE — 80061 LIPID PANEL: CPT

## 2024-02-12 PROCEDURE — 84443 ASSAY THYROID STIM HORMONE: CPT

## 2024-02-12 PROCEDURE — 82607 VITAMIN B-12: CPT

## 2024-02-12 PROCEDURE — 77063 BREAST TOMOSYNTHESIS BI: CPT | Mod: LEFT SIDE | Performed by: RADIOLOGY

## 2024-02-12 PROCEDURE — 81001 URINALYSIS AUTO W/SCOPE: CPT

## 2024-02-16 ENCOUNTER — OFFICE VISIT (OUTPATIENT)
Dept: PRIMARY CARE | Facility: CLINIC | Age: 69
End: 2024-02-16
Payer: MEDICARE

## 2024-02-16 VITALS
HEART RATE: 87 BPM | DIASTOLIC BLOOD PRESSURE: 68 MMHG | WEIGHT: 143.2 LBS | SYSTOLIC BLOOD PRESSURE: 106 MMHG | OXYGEN SATURATION: 94 % | BODY MASS INDEX: 25.37 KG/M2

## 2024-02-16 DIAGNOSIS — L50.9 URTICARIA: ICD-10-CM

## 2024-02-16 DIAGNOSIS — G43.909 MIGRAINE WITHOUT STATUS MIGRAINOSUS, NOT INTRACTABLE, UNSPECIFIED MIGRAINE TYPE: ICD-10-CM

## 2024-02-16 DIAGNOSIS — R21 RASH: ICD-10-CM

## 2024-02-16 DIAGNOSIS — G43.809 HEADACHE, VARIANT MIGRAINE: Primary | ICD-10-CM

## 2024-02-16 PROBLEM — J02.9 PHARYNGITIS: Status: ACTIVE | Noted: 2023-03-21

## 2024-02-16 PROBLEM — F19.20 SUBSTANCE DEPENDENCE (MULTI): Status: ACTIVE | Noted: 2023-04-06

## 2024-02-16 PROCEDURE — 99213 OFFICE O/P EST LOW 20 MIN: CPT | Performed by: STUDENT IN AN ORGANIZED HEALTH CARE EDUCATION/TRAINING PROGRAM

## 2024-02-16 PROCEDURE — 1160F RVW MEDS BY RX/DR IN RCRD: CPT | Performed by: STUDENT IN AN ORGANIZED HEALTH CARE EDUCATION/TRAINING PROGRAM

## 2024-02-16 PROCEDURE — 1036F TOBACCO NON-USER: CPT | Performed by: STUDENT IN AN ORGANIZED HEALTH CARE EDUCATION/TRAINING PROGRAM

## 2024-02-16 PROCEDURE — 1126F AMNT PAIN NOTED NONE PRSNT: CPT | Performed by: STUDENT IN AN ORGANIZED HEALTH CARE EDUCATION/TRAINING PROGRAM

## 2024-02-16 PROCEDURE — 1159F MED LIST DOCD IN RCRD: CPT | Performed by: STUDENT IN AN ORGANIZED HEALTH CARE EDUCATION/TRAINING PROGRAM

## 2024-02-16 RX ORDER — OXYCODONE AND ACETAMINOPHEN 5; 325 MG/1; MG/1
1 TABLET ORAL EVERY 8 HOURS PRN
Qty: 30 TABLET | Refills: 0 | Status: SHIPPED | OUTPATIENT
Start: 2024-02-16 | End: 2024-02-26

## 2024-02-16 RX ORDER — PREDNISONE 20 MG/1
TABLET ORAL
Qty: 18 TABLET | Refills: 0 | Status: SHIPPED | OUTPATIENT
Start: 2024-02-16 | End: 2024-04-09 | Stop reason: SDUPTHER

## 2024-02-16 ASSESSMENT — ENCOUNTER SYMPTOMS: DEPRESSION: 0

## 2024-02-16 ASSESSMENT — PAIN SCALES - GENERAL: PAINLEVEL: 0-NO PAIN

## 2024-02-16 NOTE — PATIENT INSTRUCTIONS
1.  Comes in for med refill.  Occasional severe headaches.  Narcotic agent refill today.  She usually only needs 1 to 2 pills/month.    2.  History of solar urticaria.  Prednisone does help refill today.

## 2024-02-16 NOTE — PROGRESS NOTES
Subjective   Patient ID: Vida Jose is a 68 y.o. female who presents for Med Refill.    HPI comes in for med refills no major changes    Review of Systems  Constitutional: NO F, chills, or sweats  Eyes: no blurred vision or visual disturbance  ENT: no hearing loss, no congestion, no nasal discharge, no hoarseness and no sore throat.   Cardiovascular: no chest pain, no edema, no palps and no syncope.   Respiratory: no cough,no s.o.b. and no wheezing  Gastrointestinal: no abdominal pain, No C/D no N/V, no blood in stools  Genitourinary: no dysuria, no change in urinary frequency, no urinary hesitancy and no feelings of urinary urgency.   Musculoskeletal: no arthralgias,  no back pain and no myalgias.   Integumentary: no new skin lesions and no rashes.   Neurological: no difficulty walking, no headache, no limb weakness, no numbness and no tingling.   Psychiatric: no anxiety, no depression, no anhedonia and no substance use disorders.   Endocrine: no recent weight gain and no recent weight loss.   Hematologic/Lymphatic: no tendency for easy bruising and no swollen glands.  Objective   /68 (BP Location: Left arm, Patient Position: Sitting, BP Cuff Size: Adult)   Pulse 87   Wt 65 kg (143 lb 3.2 oz)   SpO2 94%   BMI 25.37 kg/m²     Physical Exam  gen- a & o x 3, nad, pleasant    Assessment/Plan     1.  Comes in for med refill.  Occasional severe headaches.  Narcotic agent refill today.  She usually only needs 1 to 2 pills/month.    2.  History of solar urticaria.  Prednisone does help refill today.

## 2024-04-09 ENCOUNTER — OFFICE VISIT (OUTPATIENT)
Dept: PRIMARY CARE | Facility: CLINIC | Age: 69
End: 2024-04-09
Payer: MEDICARE

## 2024-04-09 VITALS
BODY MASS INDEX: 25.58 KG/M2 | HEART RATE: 74 BPM | SYSTOLIC BLOOD PRESSURE: 114 MMHG | HEIGHT: 62 IN | OXYGEN SATURATION: 93 % | DIASTOLIC BLOOD PRESSURE: 68 MMHG | WEIGHT: 139 LBS

## 2024-04-09 DIAGNOSIS — R21 RASH: ICD-10-CM

## 2024-04-09 PROCEDURE — 1160F RVW MEDS BY RX/DR IN RCRD: CPT | Performed by: STUDENT IN AN ORGANIZED HEALTH CARE EDUCATION/TRAINING PROGRAM

## 2024-04-09 PROCEDURE — 99214 OFFICE O/P EST MOD 30 MIN: CPT | Performed by: STUDENT IN AN ORGANIZED HEALTH CARE EDUCATION/TRAINING PROGRAM

## 2024-04-09 PROCEDURE — 1170F FXNL STATUS ASSESSED: CPT | Performed by: STUDENT IN AN ORGANIZED HEALTH CARE EDUCATION/TRAINING PROGRAM

## 2024-04-09 PROCEDURE — G0439 PPPS, SUBSEQ VISIT: HCPCS | Performed by: STUDENT IN AN ORGANIZED HEALTH CARE EDUCATION/TRAINING PROGRAM

## 2024-04-09 PROCEDURE — 1126F AMNT PAIN NOTED NONE PRSNT: CPT | Performed by: STUDENT IN AN ORGANIZED HEALTH CARE EDUCATION/TRAINING PROGRAM

## 2024-04-09 PROCEDURE — 1036F TOBACCO NON-USER: CPT | Performed by: STUDENT IN AN ORGANIZED HEALTH CARE EDUCATION/TRAINING PROGRAM

## 2024-04-09 PROCEDURE — 1159F MED LIST DOCD IN RCRD: CPT | Performed by: STUDENT IN AN ORGANIZED HEALTH CARE EDUCATION/TRAINING PROGRAM

## 2024-04-09 RX ORDER — PREDNISONE 20 MG/1
TABLET ORAL
Qty: 18 TABLET | Refills: 0 | Status: SHIPPED | OUTPATIENT
Start: 2024-04-09

## 2024-04-09 ASSESSMENT — ENCOUNTER SYMPTOMS
LOSS OF SENSATION IN FEET: 0
OCCASIONAL FEELINGS OF UNSTEADINESS: 0
DEPRESSION: 0

## 2024-04-09 ASSESSMENT — ACTIVITIES OF DAILY LIVING (ADL)
TAKING_MEDICATION: INDEPENDENT
DRESSING: INDEPENDENT
MANAGING_FINANCES: INDEPENDENT
DOING_HOUSEWORK: INDEPENDENT
BATHING: INDEPENDENT
GROCERY_SHOPPING: INDEPENDENT

## 2024-04-09 ASSESSMENT — PAIN SCALES - GENERAL: PAINLEVEL: 0-NO PAIN

## 2024-04-09 NOTE — PROGRESS NOTES
"Subjective   Patient ID: Vida Jose is a 68 y.o. female who presents for Medicare Annual Wellness Visit Subsequent (She is fasting.) and Med Refill.    HPI comes in for Medicare wellness    Review of Systems  Constitutional: NO F, chills, or sweats  Eyes: no blurred vision or visual disturbance  ENT: no hearing loss, no congestion, no nasal discharge, no hoarseness and no sore throat.   Cardiovascular: no chest pain, no edema, no palps and no syncope.   Respiratory: no cough,no s.o.b. and no wheezing  Gastrointestinal: no abdominal pain, No C/D no N/V, no blood in stools  Genitourinary: no dysuria, no change in urinary frequency, no urinary hesitancy and no feelings of urinary urgency.   Musculoskeletal: no arthralgias,  no back pain and no myalgias.   Integumentary: no new skin lesions and no rashes.   Neurological: no difficulty walking, no headache, no limb weakness, no numbness and no tingling.   Psychiatric: no anxiety, no depression, no anhedonia and no substance use disorders.   Endocrine: no recent weight gain and no recent weight loss.   Hematologic/Lymphatic: no tendency for easy bruising and no swollen glands.  Objective   /68 (BP Location: Left arm, Patient Position: Sitting, BP Cuff Size: Adult)   Pulse 74   Ht 1.575 m (5' 2\")   Wt 63 kg (139 lb)   SpO2 93%   BMI 25.42 kg/m²     Physical Exam  gen- a & o x 3, nad, pleasant  heent- eomi, perrla, ear canals patent, TM's non-erythematous, no fluid, frontal and maxillary sinus's nontender  neck- supple, nontender, no palpable or enlarged nodes, no thyromegaly  heart- rrr, no murmurs  lungs- cta b/l , no w/r/r  chest- symmetric, nontender  ab- soft, nontender, no palpable organomegaly, postive bowel sounds  ex's- no c/c/e  neuro- CNs 2-12 grossly intact, full sensation and strength in all extremities    Assessment/Plan     1.  Medicare wellness.  No concerns on exam.  No major complaints today.  She does have appoint with eye doctor for early " cataracts.  She is up-to-date with lab work and mammogram as of February 2024.  Follow-up for any issues or concerns

## 2024-04-09 NOTE — PATIENT INSTRUCTIONS
1.  Medicare wellness.  No concerns on exam.  No major complaints today.  She does have appoint with eye doctor for early cataracts.  She is up-to-date with lab work and mammogram as of February 2024.  Follow-up for any issues or concerns

## 2024-04-30 DIAGNOSIS — E78.5 HYPERLIPIDEMIA, UNSPECIFIED HYPERLIPIDEMIA TYPE: Primary | ICD-10-CM

## 2024-04-30 RX ORDER — ROSUVASTATIN CALCIUM 40 MG/1
40 TABLET, COATED ORAL DAILY
Qty: 90 TABLET | Refills: 3 | Status: SHIPPED | OUTPATIENT
Start: 2024-04-30 | End: 2024-05-07 | Stop reason: SDUPTHER

## 2024-05-07 DIAGNOSIS — E78.5 HYPERLIPIDEMIA, UNSPECIFIED HYPERLIPIDEMIA TYPE: ICD-10-CM

## 2024-05-08 RX ORDER — ROSUVASTATIN CALCIUM 40 MG/1
40 TABLET, COATED ORAL DAILY
Qty: 90 TABLET | Refills: 3 | Status: SHIPPED | OUTPATIENT
Start: 2024-05-08 | End: 2025-05-08

## 2024-05-29 DIAGNOSIS — B00.1 RECURRENT COLD SORES: ICD-10-CM

## 2024-05-29 RX ORDER — ACYCLOVIR 400 MG/1
400 TABLET ORAL DAILY
Qty: 30 TABLET | Refills: 6 | Status: SHIPPED | OUTPATIENT
Start: 2024-05-29

## 2024-07-22 DIAGNOSIS — M54.9 BACK PAIN, UNSPECIFIED BACK LOCATION, UNSPECIFIED BACK PAIN LATERALITY, UNSPECIFIED CHRONICITY: ICD-10-CM

## 2024-07-22 RX ORDER — DICLOFENAC SODIUM 75 MG/1
75 TABLET, DELAYED RELEASE ORAL DAILY
Qty: 90 TABLET | Refills: 3 | Status: SHIPPED | OUTPATIENT
Start: 2024-07-22

## 2024-09-26 ENCOUNTER — APPOINTMENT (OUTPATIENT)
Dept: PRIMARY CARE | Facility: CLINIC | Age: 69
End: 2024-09-26
Payer: MEDICARE

## 2024-09-26 VITALS
SYSTOLIC BLOOD PRESSURE: 133 MMHG | HEIGHT: 62 IN | DIASTOLIC BLOOD PRESSURE: 84 MMHG | WEIGHT: 136 LBS | BODY MASS INDEX: 25.03 KG/M2 | HEART RATE: 69 BPM | OXYGEN SATURATION: 98 %

## 2024-09-26 DIAGNOSIS — Z12.11 ENCOUNTER FOR SCREENING FOR MALIGNANT NEOPLASM OF COLON: Primary | ICD-10-CM

## 2024-09-26 DIAGNOSIS — F41.9 ANXIETY: ICD-10-CM

## 2024-09-26 DIAGNOSIS — Z87.891 QUIT SMOKING: ICD-10-CM

## 2024-09-26 DIAGNOSIS — Z79.1 LONG TERM (CURRENT) USE OF NON-STEROIDAL ANTI-INFLAMMATORIES (NSAID): ICD-10-CM

## 2024-09-26 DIAGNOSIS — E78.5 HYPERLIPIDEMIA, UNSPECIFIED HYPERLIPIDEMIA TYPE: ICD-10-CM

## 2024-09-26 PROBLEM — C50.919 MALIGNANT NEOPLASM OF BREAST: Status: RESOLVED | Noted: 2023-03-21 | Resolved: 2024-09-26

## 2024-09-26 PROBLEM — C50.919 BREAST CA (MULTI): Status: RESOLVED | Noted: 2023-05-16 | Resolved: 2024-09-26

## 2024-09-26 PROCEDURE — 3008F BODY MASS INDEX DOCD: CPT | Performed by: INTERNAL MEDICINE

## 2024-09-26 PROCEDURE — 1036F TOBACCO NON-USER: CPT | Performed by: INTERNAL MEDICINE

## 2024-09-26 PROCEDURE — 99213 OFFICE O/P EST LOW 20 MIN: CPT | Performed by: INTERNAL MEDICINE

## 2024-09-26 PROCEDURE — 1124F ACP DISCUSS-NO DSCNMKR DOCD: CPT | Performed by: INTERNAL MEDICINE

## 2024-09-26 RX ORDER — PANTOPRAZOLE SODIUM 20 MG/1
20 TABLET, DELAYED RELEASE ORAL
Qty: 90 TABLET | Refills: 3 | Status: SHIPPED | OUTPATIENT
Start: 2024-09-26 | End: 2024-09-27

## 2024-09-26 RX ORDER — OXYCODONE AND ACETAMINOPHEN 5; 325 MG/1; MG/1
1 TABLET ORAL AS NEEDED
COMMUNITY

## 2024-09-26 RX ORDER — ROSUVASTATIN CALCIUM 40 MG/1
40 TABLET, COATED ORAL DAILY
Qty: 90 TABLET | Refills: 3 | Status: SHIPPED | OUTPATIENT
Start: 2024-09-26 | End: 2025-09-26

## 2024-09-26 ASSESSMENT — ENCOUNTER SYMPTOMS
SHORTNESS OF BREATH: 0
DIZZINESS: 0
BLOOD IN STOOL: 0
FATIGUE: 0
CONSTIPATION: 1
HEADACHES: 1
NERVOUS/ANXIOUS: 1
SLEEP DISTURBANCE: 0

## 2024-09-26 ASSESSMENT — PATIENT HEALTH QUESTIONNAIRE - PHQ9
2. FEELING DOWN, DEPRESSED OR HOPELESS: NOT AT ALL
1. LITTLE INTEREST OR PLEASURE IN DOING THINGS: NOT AT ALL
SUM OF ALL RESPONSES TO PHQ9 QUESTIONS 1 AND 2: 0

## 2024-09-26 NOTE — PROGRESS NOTES
"Subjective   Patient ID: Vida Jose is a 68 y.o. female who presents for New Patient Visit.    Here to get established from Dr. Newberry's office.    PMH:  -Anxiety: Not on medication for this. Takes valium intermittently from old scripts. Says she does it more along certain holidays.  -CAD: Sees Dr. Byrne. Is on ASA, crestor, fish oil.  -Migraines: Gets a migraine around once a month. Has tried other medications but doesn't remember which. Now takes percocet for it.  -Back pain: Takes flexeril as needed. Has been on diclofenac daily for years.  -Heat rash: Gets them frequently. Will take prednisone when this occurs.  -Recurrent herpes: Takes acyclovir 400mg daily. When she stops her nose breaks out.  -Upset stomach: Can occur from nerves. Will take librax PRN for this.        Review of Systems   Constitutional:  Negative for fatigue.   Respiratory:  Negative for shortness of breath.    Cardiovascular:  Negative for chest pain.   Gastrointestinal:  Positive for constipation. Negative for blood in stool.   Neurological:  Positive for headaches. Negative for dizziness.   Psychiatric/Behavioral:  Negative for sleep disturbance. The patient is nervous/anxious.        /84   Pulse 69   Ht 1.575 m (5' 2\")   Wt 61.7 kg (136 lb)   SpO2 98%   BMI 24.87 kg/m²   Objective   Physical Exam  Constitutional:       General: She is not in acute distress.     Appearance: She is not ill-appearing, toxic-appearing or diaphoretic.   HENT:      Head: Normocephalic and atraumatic.   Eyes:      Conjunctiva/sclera: Conjunctivae normal.   Cardiovascular:      Rate and Rhythm: Normal rate and regular rhythm.      Heart sounds: No murmur heard.     No friction rub. No gallop.   Pulmonary:      Effort: Pulmonary effort is normal. No respiratory distress.      Breath sounds: No stridor. No wheezing, rhonchi or rales.   Abdominal:      General: Abdomen is flat. Bowel sounds are normal. There is no distension.      Palpations: Abdomen " is soft.      Tenderness: There is no abdominal tenderness. There is no guarding.   Musculoskeletal:      Cervical back: Normal range of motion. No rigidity or tenderness.      Right lower leg: No edema.      Left lower leg: No edema.   Lymphadenopathy:      Cervical: No cervical adenopathy.   Skin:     General: Skin is warm and dry.   Neurological:      Mental Status: She is alert.         Assessment/Plan   Problem List Items Addressed This Visit             ICD-10-CM    Hyperlipidemia E78.5     -Pt tolerating crestor without issue. Wondering if she can get 90 day fills instead of 30; reviewed that that is what is being sent in. Questioned if insurance is limiting this on their end. Will re-send today. Pt agreeable.         Relevant Medications    rosuvastatin (Crestor) 40 mg tablet    Anxiety F41.9     -Pt states its been present her whole life. Only once was on wellbutrin which helped dull her symptoms (was on it to stop smoking). Saw a therapist for 5 years after her divorce as well which also helped.  -Pt declines medication or therapy at this time. States she manages as is. Takes valium PRN around the holidays for days that have been particularly traumatic for her (states she has leftover pills because of how little she takes medication). To let us know if she changes her mind.         Long term (current) use of non-steroidal anti-inflammatories (nsaid) Z79.1     -Takes diclofenac daily for her back pain with good control. Has been doing this for over 10 years.  -Will start PPI for gastric protection particularly since pt's stomach already gets upset when she is more anxious. Reviewed how to take medication. Pt agreeable with plan.         Relevant Medications    pantoprazole (Protonix) 20 mg EC tablet     Other Visit Diagnoses         Codes    Encounter for screening for malignant neoplasm of colon    -  Primary Z12.11    Relevant Orders    Cologuard® colon cancer screening    Quit smoking     Z87.891     Relevant Orders    CT lung screening low dose        -Pt agreeable to cologuard and CT lung screen. Declines DEXA scan and flu shot.       We discussed the purpose of lung cancer screening and the risks and benefits associated with it. Importance of annual screening emphasized. Pt and myself used shared decision making and have decided to proceed with imaging. All questions answered.      Nader Peterson MD 09/26/24 3:13 PM

## 2024-09-26 NOTE — ASSESSMENT & PLAN NOTE
-Pt tolerating crestor without issue. Wondering if she can get 90 day fills instead of 30; reviewed that that is what is being sent in. Questioned if insurance is limiting this on their end. Will re-send today. Pt agreeable.

## 2024-09-26 NOTE — ASSESSMENT & PLAN NOTE
-Pt states its been present her whole life. Only once was on wellbutrin which helped dull her symptoms (was on it to stop smoking). Saw a therapist for 5 years after her divorce as well which also helped.  -Pt declines medication or therapy at this time. States she manages as is. Takes valium PRN around the holidays for days that have been particularly traumatic for her (states she has leftover pills because of how little she takes medication). To let us know if she changes her mind.

## 2024-09-26 NOTE — ASSESSMENT & PLAN NOTE
-Takes diclofenac daily for her back pain with good control. Has been doing this for over 10 years.  -Will start PPI for gastric protection particularly since pt's stomach already gets upset when she is more anxious. Reviewed how to take medication. Pt agreeable with plan.

## 2024-09-27 DIAGNOSIS — Z79.1 LONG TERM (CURRENT) USE OF NON-STEROIDAL ANTI-INFLAMMATORIES (NSAID): ICD-10-CM

## 2024-09-27 RX ORDER — PANTOPRAZOLE SODIUM 20 MG/1
20 TABLET, DELAYED RELEASE ORAL
Qty: 90 TABLET | Refills: 3 | Status: SHIPPED | OUTPATIENT
Start: 2024-09-27

## 2024-10-15 DIAGNOSIS — F41.9 ANXIETY: Primary | ICD-10-CM

## 2024-10-15 RX ORDER — CHLORDIAZEPOXIDE HYDROCHLORIDE AND CLIDINIUM BROMIDE 5; 2.5 MG/1; MG/1
1-2 CAPSULE ORAL EVERY 6 HOURS PRN
Qty: 30 CAPSULE | Refills: 3 | Status: SHIPPED | OUTPATIENT
Start: 2024-10-15

## 2024-10-17 PROBLEM — I25.10 ARTERIOSCLEROSIS OF CORONARY ARTERY: Chronic | Status: ACTIVE | Noted: 2023-03-21

## 2024-10-17 PROBLEM — E78.5 HYPERLIPIDEMIA: Chronic | Status: ACTIVE | Noted: 2023-03-21

## 2024-11-07 ENCOUNTER — OFFICE VISIT (OUTPATIENT)
Dept: CARDIOLOGY | Facility: CLINIC | Age: 69
End: 2024-11-07
Payer: MEDICARE

## 2024-11-07 VITALS
WEIGHT: 133 LBS | BODY MASS INDEX: 24.33 KG/M2 | OXYGEN SATURATION: 97 % | HEART RATE: 82 BPM | SYSTOLIC BLOOD PRESSURE: 118 MMHG | DIASTOLIC BLOOD PRESSURE: 68 MMHG

## 2024-11-07 DIAGNOSIS — I25.10 ARTERIOSCLEROSIS OF CORONARY ARTERY: Primary | Chronic | ICD-10-CM

## 2024-11-07 DIAGNOSIS — E78.2 MIXED HYPERLIPIDEMIA: Chronic | ICD-10-CM

## 2024-11-07 PROBLEM — B00.1 RECURRENT COLD SORES: Status: RESOLVED | Noted: 2023-03-21 | Resolved: 2024-11-07

## 2024-11-07 PROBLEM — J02.9 PHARYNGITIS: Status: RESOLVED | Noted: 2023-03-21 | Resolved: 2024-11-07

## 2024-11-07 PROBLEM — Z98.890 HISTORY OF FOOT OPERATION: Status: RESOLVED | Noted: 2018-03-21 | Resolved: 2024-11-07

## 2024-11-07 PROBLEM — M54.59 OTHER LOW BACK PAIN: Status: RESOLVED | Noted: 2023-04-05 | Resolved: 2024-11-07

## 2024-11-07 PROBLEM — M62.830 SPASM OF THORACIC BACK MUSCLE: Status: RESOLVED | Noted: 2023-03-21 | Resolved: 2024-11-07

## 2024-11-07 PROBLEM — S93.409A SPRAIN OF ANKLE: Status: RESOLVED | Noted: 2023-04-06 | Resolved: 2024-11-07

## 2024-11-07 PROBLEM — M15.9 GENERALIZED OSTEOARTHRITIS: Status: RESOLVED | Noted: 2023-03-21 | Resolved: 2024-11-07

## 2024-11-07 PROBLEM — M21.629 TAILOR'S BUNION: Status: RESOLVED | Noted: 2017-03-02 | Resolved: 2024-11-07

## 2024-11-07 PROBLEM — M20.11 ACQUIRED HALLUX VALGUS OF RIGHT FOOT: Status: RESOLVED | Noted: 2017-03-02 | Resolved: 2024-11-07

## 2024-11-07 PROBLEM — M54.9 BACK PAIN: Status: RESOLVED | Noted: 2023-05-16 | Resolved: 2024-11-07

## 2024-11-07 PROBLEM — M79.671 RIGHT FOOT PAIN: Status: RESOLVED | Noted: 2018-01-18 | Resolved: 2024-11-07

## 2024-11-07 PROBLEM — Z79.1 LONG TERM (CURRENT) USE OF NON-STEROIDAL ANTI-INFLAMMATORIES (NSAID): Status: RESOLVED | Noted: 2024-09-26 | Resolved: 2024-11-07

## 2024-11-07 PROBLEM — R21 RASH: Status: RESOLVED | Noted: 2024-02-16 | Resolved: 2024-11-07

## 2024-11-07 PROBLEM — L25.9 CONTACT DERMATITIS: Status: RESOLVED | Noted: 2023-03-21 | Resolved: 2024-11-07

## 2024-11-07 PROBLEM — R93.1 ELEVATED CORONARY ARTERY CALCIUM SCORE: Status: RESOLVED | Noted: 2023-03-21 | Resolved: 2024-11-07

## 2024-11-07 PROBLEM — M54.50 LOW BACK PAIN: Status: RESOLVED | Noted: 2023-03-21 | Resolved: 2024-11-07

## 2024-11-07 PROBLEM — K58.9 IRRITABLE BOWEL SYNDROME: Status: RESOLVED | Noted: 2023-03-21 | Resolved: 2024-11-07

## 2024-11-07 LAB
ATRIAL RATE: 59 BPM
P AXIS: 58 DEGREES
P OFFSET: 199 MS
P ONSET: 149 MS
PR INTERVAL: 152 MS
Q ONSET: 225 MS
QRS COUNT: 10 BEATS
QRS DURATION: 82 MS
QT INTERVAL: 454 MS
QTC CALCULATION(BAZETT): 449 MS
QTC FREDERICIA: 451 MS
R AXIS: 18 DEGREES
T AXIS: 72 DEGREES
T OFFSET: 452 MS
VENTRICULAR RATE: 59 BPM

## 2024-11-07 PROCEDURE — 1159F MED LIST DOCD IN RCRD: CPT | Performed by: INTERNAL MEDICINE

## 2024-11-07 PROCEDURE — 1160F RVW MEDS BY RX/DR IN RCRD: CPT | Performed by: INTERNAL MEDICINE

## 2024-11-07 PROCEDURE — 99213 OFFICE O/P EST LOW 20 MIN: CPT | Performed by: INTERNAL MEDICINE

## 2024-11-07 PROCEDURE — 1036F TOBACCO NON-USER: CPT | Performed by: INTERNAL MEDICINE

## 2024-11-07 PROCEDURE — 93005 ELECTROCARDIOGRAM TRACING: CPT | Performed by: INTERNAL MEDICINE

## 2024-11-07 NOTE — PATIENT INSTRUCTIONS
1. CAD. Elevated calcium score 1350 units. Stress testing January 2021 excellent functional capacity for age with normal perfusion. Continue with aspirin, and statins. She is exercising and watching her diet.I'll ask for her to have a nuc stress next year.     2. Hyperlipidemia. Baseline .  2/12/2024 LDL 45 HDL 51 triglycerides 119.  Followed by Dr. Peterson    EKG today. TST next year. RTC 1 year.

## 2024-11-07 NOTE — PROGRESS NOTES
Referred by No ref. provider found    HPI I'm seeing Vida for her yearly follow up. Doing great. Remains very active w/o problems. No CP/SOB/Palp.  Wt down 12#    Past Medical History:  Problem List Items Addressed This Visit    None     Past Medical History:   Diagnosis Date    Arteriosclerosis of coronary artery 03/21/2023    10/13/2020: CAC = 1320 Agatston units      Hyperlipidemia 03/21/2023    Unable to tolerate simvastatin      Personal history of other diseases of the respiratory system 12/22/2020    History of pharyngitis    Personal history of other diseases of the respiratory system 01/28/2020    History of acute bronchitis    Personal history of other diseases of the respiratory system 10/24/2017    History of acute bacterial sinusitis    Toxic effect of venom of other arthropod, accidental (unintentional), initial encounter 09/08/2017    Local reaction to insect sting      Past Surgical History:  She has a past surgical history that includes Mastectomy (07/05/2016).      Social History:  She reports that she quit smoking about 10 years ago. Her smoking use included cigarettes. She started smoking about 58 years ago. She has a 48 pack-year smoking history. She has never used smokeless tobacco. She reports current alcohol use. She reports that she does not use drugs.    Family History:  Family History   Problem Relation Name Age of Onset    Cancer Mother          gallbladder    Liver cancer Mother      Pancreatic cancer Mother      Coronary artery disease Father      Heart attack Father  52    Heart attack Brother       Allergies:  Patient has no known allergies.    Outpatient Medications:  Current Outpatient Medications   Medication Instructions    acyclovir (ZOVIRAX) 400 mg, oral, Daily    aspirin 81 mg EC tablet 1 tablet, oral, Daily    chlordiazepoxide-clidinium (Librax) 5-2.5 mg capsule 1-2 capsules, oral, Every 6 hours PRN    cyclobenzaprine (FLEXERIL) 10 mg, oral, As needed    diclofenac  "(VOLTAREN) 75 mg, oral, Daily    oxyCODONE-acetaminophen (Percocet) 5-325 mg tablet 1 tablet, oral, As needed, For migraines     pantoprazole (PROTONIX) 20 mg, oral, Daily before breakfast, DO NOT CRUSH CHEW OR SPLIT    predniSONE (Deltasone) 20 mg tablet Take 3 tabs po qday x 3 days, then 2 tabs po qday x 3 days, then 1 tab po qday x 3 days then stop.    rosuvastatin (CRESTOR) 40 mg, oral, Daily     Last Recorded Vitals:  There were no vitals filed for this visit.    Physical Exam  Patient is alert and oriented x3.  HEENT is unremarkable mucous members are moist  Neck no JVP no bruits upstrokes are full no thyromegaly  Lungs are clear bilaterally.  No wheezing crackles or rales  Heart regular rhythm normal S1-S2 there is no S3 no murmurs are heard.  Abdomen is soft bs are positive nontender nondistended no organomegaly no pulsatile masses  Extremities have no edema.  Distal pulses present palpable.  Neuro is grossly nonfocal  Skin has no rashes     Last Labs:  CBC -  Lab Results   Component Value Date    WBC 8.0 02/12/2024    HGB 14.9 02/12/2024    HCT 44.1 02/12/2024    MCV 93 02/12/2024     02/12/2024     CMP -  Lab Results   Component Value Date    CALCIUM 9.1 02/12/2024    PROT 6.5 02/12/2024    ALBUMIN 4.2 02/12/2024    AST 17 02/12/2024    ALT 16 02/12/2024    ALKPHOS 77 02/12/2024    BILITOT 0.5 02/12/2024     LIPID PANEL -   Lab Results   Component Value Date    CHOL 120 02/12/2024    HDL 50.8 02/12/2024    CHHDL 2.4 02/12/2024    VLDL 24 02/12/2024    TRIG 119 02/12/2024    NHDL 69 02/12/2024     RENAL FUNCTION PANEL -   Lab Results   Component Value Date    K 3.6 02/12/2024     No results found for: \"BNP\", \"HGBA1C\"       Procedure  HOLTER [03/17/2022]: Sinus rhythm, -74. No ep/of A-fib / PSVT / high-grade AV block. No VT.      EX NST [01/18/2021]: 9 min 1 sec (10.10 METs) . . . Normal â€“ 75%Excellent capacity for age.     CT / SCORING [10/13/2020] = 1320.66 (.37, .86, LCx " 150.43, RCA 0)     STRESS ECHO [01/25/2018]: Resting EF 60-65% w/peak 65-70%. Excellent functional capacity for age. No clinical / EKG ev/for ischemia at max WL. Adequate level of stress achieved.     Assessment/Plan   1. CAD. Elevated calcium score 1350 units. Stress testing January 2021 excellent functional capacity for age with normal perfusion. Continue with aspirin, and statins. She is exercising and watching her diet.I'll ask for her to have a nuc stress next year.     2. Hyperlipidemia. Baseline .  2/12/2024 LDL 45 HDL 51 triglycerides 119.  Followed by Dr. Peterson    EKG today. TST next year. RTC 1 year.   Rush Byrne MD     Instructions and follow up

## 2024-11-19 ENCOUNTER — TELEPHONE (OUTPATIENT)
Dept: PRIMARY CARE | Facility: CLINIC | Age: 69
End: 2024-11-19
Payer: MEDICARE

## 2024-11-19 DIAGNOSIS — Z12.11 ENCOUNTER FOR SCREENING FOR MALIGNANT NEOPLASM OF COLON: Primary | ICD-10-CM

## 2024-11-19 LAB — NONINV COLON CA DNA+OCC BLD SCRN STL QL: POSITIVE

## 2024-12-24 ENCOUNTER — HOSPITAL ENCOUNTER (OUTPATIENT)
Dept: RADIOLOGY | Facility: CLINIC | Age: 69
Discharge: HOME | End: 2024-12-24
Payer: MEDICARE

## 2024-12-24 DIAGNOSIS — Z87.891 QUIT SMOKING: ICD-10-CM

## 2024-12-24 PROCEDURE — 71271 CT THORAX LUNG CANCER SCR C-: CPT

## 2024-12-30 ENCOUNTER — TELEPHONE (OUTPATIENT)
Dept: PRIMARY CARE | Facility: CLINIC | Age: 69
End: 2024-12-30
Payer: MEDICARE

## 2024-12-30 DIAGNOSIS — R91.8 LUNG NODULES: Primary | ICD-10-CM

## 2024-12-30 NOTE — TELEPHONE ENCOUNTER
Informed pt of results; will do follow up scan in 6 months but was wondering about work-up needed for calcified left-sided coronary arteries. Pt has a cardiologist and is scheduled for a stress-test soon, but is cardiac score indicated on PCP's end? Also, will pt's emphysema self-repair with time?

## 2025-01-06 DIAGNOSIS — B00.1 RECURRENT COLD SORES: ICD-10-CM

## 2025-01-06 RX ORDER — ACYCLOVIR 400 MG/1
400 TABLET ORAL DAILY
Qty: 30 TABLET | Refills: 6 | Status: SHIPPED | OUTPATIENT
Start: 2025-01-06

## 2025-01-17 RX ORDER — DIAZEPAM 5 MG/1
5 TABLET ORAL EVERY 8 HOURS PRN
COMMUNITY

## 2025-01-17 RX ORDER — GLUCOSAM/CHONDRO/HERB 149/HYAL 750-100 MG
TABLET ORAL DAILY
COMMUNITY

## 2025-01-20 ENCOUNTER — ANESTHESIA EVENT (OUTPATIENT)
Dept: GASTROENTEROLOGY | Facility: HOSPITAL | Age: 70
End: 2025-01-20
Payer: MEDICARE

## 2025-01-20 ENCOUNTER — HOSPITAL ENCOUNTER (OUTPATIENT)
Dept: GASTROENTEROLOGY | Facility: HOSPITAL | Age: 70
Discharge: HOME | End: 2025-01-20
Payer: MEDICARE

## 2025-01-20 ENCOUNTER — ANESTHESIA (OUTPATIENT)
Dept: GASTROENTEROLOGY | Facility: HOSPITAL | Age: 70
End: 2025-01-20
Payer: MEDICARE

## 2025-01-20 VITALS
TEMPERATURE: 97 F | HEART RATE: 59 BPM | WEIGHT: 130 LBS | BODY MASS INDEX: 23.78 KG/M2 | RESPIRATION RATE: 18 BRPM | DIASTOLIC BLOOD PRESSURE: 64 MMHG | OXYGEN SATURATION: 100 % | SYSTOLIC BLOOD PRESSURE: 116 MMHG

## 2025-01-20 DIAGNOSIS — Z12.11 ENCOUNTER FOR SCREENING FOR MALIGNANT NEOPLASM OF COLON: ICD-10-CM

## 2025-01-20 PROCEDURE — 2500000004 HC RX 250 GENERAL PHARMACY W/ HCPCS (ALT 636 FOR OP/ED)

## 2025-01-20 PROCEDURE — 99100 ANES PT EXTEME AGE<1 YR&>70: CPT | Performed by: ANESTHESIOLOGY

## 2025-01-20 PROCEDURE — 3700000002 HC GENERAL ANESTHESIA TIME - EACH INCREMENTAL 1 MINUTE

## 2025-01-20 PROCEDURE — 7100000009 HC PHASE TWO TIME - INITIAL BASE CHARGE

## 2025-01-20 PROCEDURE — A45385 PR COLONOSCOPY,REMV LESN,SNARE: Performed by: ANESTHESIOLOGY

## 2025-01-20 PROCEDURE — 7100000010 HC PHASE TWO TIME - EACH INCREMENTAL 1 MINUTE

## 2025-01-20 PROCEDURE — A45385 PR COLONOSCOPY,REMV LESN,SNARE

## 2025-01-20 PROCEDURE — 45385 COLONOSCOPY W/LESION REMOVAL: CPT | Performed by: INTERNAL MEDICINE

## 2025-01-20 PROCEDURE — 3700000001 HC GENERAL ANESTHESIA TIME - INITIAL BASE CHARGE

## 2025-01-20 RX ORDER — LIDOCAINE HCL/PF 100 MG/5ML
SYRINGE (ML) INTRAVENOUS AS NEEDED
Status: DISCONTINUED | OUTPATIENT
Start: 2025-01-20 | End: 2025-01-20

## 2025-01-20 RX ORDER — PROPOFOL 10 MG/ML
INJECTION, EMULSION INTRAVENOUS AS NEEDED
Status: DISCONTINUED | OUTPATIENT
Start: 2025-01-20 | End: 2025-01-20

## 2025-01-20 RX ADMIN — PROPOFOL 40 MG: 10 INJECTION, EMULSION INTRAVENOUS at 11:36

## 2025-01-20 RX ADMIN — PROPOFOL 150 MCG/KG/MIN: 10 INJECTION, EMULSION INTRAVENOUS at 11:36

## 2025-01-20 RX ADMIN — LIDOCAINE HYDROCHLORIDE 40 MG: 20 INJECTION, SOLUTION INTRAVENOUS at 11:36

## 2025-01-20 RX ADMIN — PROPOFOL 20 MG: 10 INJECTION, EMULSION INTRAVENOUS at 11:37

## 2025-01-20 SDOH — HEALTH STABILITY: MENTAL HEALTH: CURRENT SMOKER: 0

## 2025-01-20 ASSESSMENT — PAIN SCALES - GENERAL
PAINLEVEL_OUTOF10: 0 - NO PAIN
PAIN_LEVEL: 0

## 2025-01-20 ASSESSMENT — PAIN - FUNCTIONAL ASSESSMENT
PAIN_FUNCTIONAL_ASSESSMENT: 0-10

## 2025-01-20 ASSESSMENT — COLUMBIA-SUICIDE SEVERITY RATING SCALE - C-SSRS
1. IN THE PAST MONTH, HAVE YOU WISHED YOU WERE DEAD OR WISHED YOU COULD GO TO SLEEP AND NOT WAKE UP?: NO
2. HAVE YOU ACTUALLY HAD ANY THOUGHTS OF KILLING YOURSELF?: NO
6. HAVE YOU EVER DONE ANYTHING, STARTED TO DO ANYTHING, OR PREPARED TO DO ANYTHING TO END YOUR LIFE?: NO

## 2025-01-20 NOTE — ANESTHESIA POSTPROCEDURE EVALUATION
Patient: Vida Jose    Procedure Summary       Date: 01/20/25 Room / Location: [facility]    Anesthesia Start: 1131 Anesthesia Stop: 1216    Procedure: COLONOSCOPY Diagnosis: Encounter for screening for malignant neoplasm of colon    Scheduled Providers: Jaxson Kirby MD Responsible Provider: Andres Hernandez MD    Anesthesia Type: MAC ASA Status: 2            Anesthesia Type: MAC    Vitals Value Taken Time   /64 01/20/25 1240   Temp 36.1 °C (97 °F) 01/20/25 1210   Pulse 59 01/20/25 1240   Resp 18 01/20/25 1240   SpO2 100 % 01/20/25 1240       Anesthesia Post Evaluation    Patient location during evaluation: PACU  Patient participation: complete - patient participated  Level of consciousness: awake and alert  Pain score: 0  Pain management: adequate  Airway patency: patent  Cardiovascular status: acceptable  Respiratory status: acceptable  Hydration status: acceptable  Postoperative Nausea and Vomiting: none        There were no known notable events for this encounter.

## 2025-01-20 NOTE — ANESTHESIA PREPROCEDURE EVALUATION
Patient: Vida Jose    Procedure Information       Date/Time: 01/20/25 1100    Scheduled providers: Jaxson Kirby MD    Procedure: COLONOSCOPY    Location: [facility]            Relevant Problems   Anesthesia (within normal limits)      Cardiac   (+) Arteriosclerosis of coronary artery   (+) Hyperlipidemia      Neuro   (+) Anxiety       Clinical information reviewed:    Allergies  Meds               NPO Detail:  NPO/Void Status  Date of Last Liquid: 01/20/25  Time of Last Liquid: 0800  Date of Last Solid: 01/18/25  Last Intake Type: Clear fluids         Physical Exam    Airway  Mallampati: I  TM distance: >3 FB  Neck ROM: full     Cardiovascular - normal exam     Dental   (+) upper dentures, lower dentures     Pulmonary - normal exam     Abdominal - normal exam             Anesthesia Plan    History of general anesthesia?: yes  History of complications of general anesthesia?: no    ASA 2     MAC     The patient is not a current smoker.  Patient was previously instructed to abstain from smoking on day of procedure.  Patient did not smoke on day of procedure.    intravenous induction   Anesthetic plan and risks discussed with patient.  Use of blood products discussed with patient who consented to blood products.    Plan discussed with CAA.

## 2025-01-20 NOTE — H&P
Outpatient Hospital Procedure    Patient Profile-Procedures  Initial Info  Patient Demographics  Name Vida Jose  Date of Birth 1955  MRN [id number]  Address   [address] [id number] [place] [address]    Primary Phone Number [phone]  Secondary Phone Number    Omar Acosta    Procedures   Colonoscopy      Indication:  Positive cologard    Primary contact name and number   Extended Emergency Contact Information  Primary Emergency Contact: Villa Jose  Home Phone: [phone]  Relation: Spouse    General Health  Weight   Vitals:    01/20/25 1009   Weight: 59 kg (130 lb)     BMI Body mass index is 23.78 kg/m².    Allergies  No Known Allergies    Past Medical History   Past Medical History:   Diagnosis Date    Arteriosclerosis of coronary artery 03/21/2023    10/13/2020: CAC = 1320 Agatston units      Hyperlipidemia 03/21/2023    Unable to tolerate simvastatin      Personal history of other diseases of the respiratory system 12/22/2020    History of pharyngitis    Personal history of other diseases of the respiratory system 01/28/2020    History of acute bronchitis    Personal history of other diseases of the respiratory system 10/24/2017    History of acute bacterial sinusitis    Toxic effect of venom of other arthropod, accidental (unintentional), initial encounter 09/08/2017    Local reaction to insect sting       Provider assessment  Diagnosis  Medication Reviewed - yes  Prior to Admission medications    Medication Sig Start Date End Date Taking? Authorizing Provider   acyclovir (Zovirax) 400 mg tablet Take 1 tablet (400 mg) by mouth once daily. 1/6/25  Yes Nader Peterson MD   aspirin 81 mg EC tablet Take 1 tablet (81 mg) by mouth once daily. 2/9/21  Yes Historical Provider, MD   chlordiazepoxide-clidinium (Librax) 5-2.5 mg capsule Take 1-2 capsules by mouth every 6 hours if needed for cramping (abdominal pain). 10/15/24  Yes Nader Peterson MD    diazePAM (Valium) 5 mg tablet Take 1 tablet (5 mg) by mouth every 8 hours if needed for anxiety.   Yes Historical Provider, MD   diclofenac (Voltaren) 75 mg EC tablet Take 1 tablet (75 mg) by mouth once daily. 7/22/24  Yes Omar Newberry, DO   omega 3-dha-epa-fish oil (Fish OiL) 1,000 (120-180) mg capsule Take by mouth once daily.   Yes Historical Provider, MD   oxyCODONE-acetaminophen (Percocet) 5-325 mg tablet Take 1 tablet by mouth if needed for severe pain (7 - 10). For migraines   Yes Historical Provider, MD   pantoprazole (ProtoNix) 20 mg EC tablet TAKE 1 TABLET BY MOUTH ONCE DAILY IN THE MORNING. TAKE BEFORE MEALS. DO NOT CRUSH, CHEW, OR SPLIT 9/27/24  Yes Nader Peterson MD   predniSONE (Deltasone) 20 mg tablet Take 3 tabs po qday x 3 days, then 2 tabs po qday x 3 days, then 1 tab po qday x 3 days then stop. 4/9/24  Yes Omar Newberry DO   rosuvastatin (Crestor) 40 mg tablet Take 1 tablet (40 mg) by mouth once daily. 9/26/24 9/26/25 Yes Nader Peterson MD   cyclobenzaprine (Flexeril) 10 mg tablet Take 1 tablet (10 mg) by mouth if needed for muscle spasms (qhs). 11/6/23 1/17/25  Omar Newberry, DO       This is my H&P    Physical Exam  Physical Exam  Constitutional:       Appearance: Normal appearance.   HENT:      Head: Normocephalic.      Mouth/Throat:      Mouth: Mucous membranes are moist.   Eyes:      Extraocular Movements: Extraocular movements intact.      Pupils: Pupils are equal, round, and reactive to light.   Cardiovascular:      Rate and Rhythm: Normal rate and regular rhythm.   Pulmonary:      Effort: Pulmonary effort is normal.   Abdominal:      General: Bowel sounds are normal.      Palpations: Abdomen is soft.   Neurological:      General: No focal deficit present.      Mental Status: She is alert.       ASA PS Classification 2  Sedation Plan Moderate  Procedure Plan - pre-procedural (re)assesment completed by physician:  discharge/transfer patient when discharge criteria met    Jaxson Kirby  MD  1/20/2025 11:18 AM

## 2025-01-29 LAB
LABORATORY COMMENT REPORT: NORMAL
PATH REPORT.FINAL DX SPEC: NORMAL
PATH REPORT.GROSS SPEC: NORMAL
PATH REPORT.RELEVANT HX SPEC: NORMAL
PATH REPORT.TOTAL CANCER: NORMAL

## 2025-03-26 ENCOUNTER — APPOINTMENT (OUTPATIENT)
Dept: PRIMARY CARE | Facility: CLINIC | Age: 70
End: 2025-03-26
Payer: MEDICARE

## 2025-04-01 ENCOUNTER — TELEPHONE (OUTPATIENT)
Dept: PRIMARY CARE | Facility: CLINIC | Age: 70
End: 2025-04-01
Payer: MEDICARE

## 2025-04-01 RX ORDER — OXYCODONE AND ACETAMINOPHEN 5; 325 MG/1; MG/1
1 TABLET ORAL AS NEEDED
Qty: 5 TABLET | OUTPATIENT
Start: 2025-04-01

## 2025-04-01 NOTE — TELEPHONE ENCOUNTER
This will need to be discussed with Dr. Peterson if he wishes to continue us of this for migraines

## 2025-04-04 ENCOUNTER — HOSPITAL ENCOUNTER (EMERGENCY)
Facility: HOSPITAL | Age: 70
Discharge: HOME | End: 2025-04-04
Payer: MEDICARE

## 2025-04-04 VITALS
DIASTOLIC BLOOD PRESSURE: 88 MMHG | HEART RATE: 80 BPM | RESPIRATION RATE: 18 BRPM | SYSTOLIC BLOOD PRESSURE: 128 MMHG | TEMPERATURE: 98.6 F | BODY MASS INDEX: 23.04 KG/M2 | OXYGEN SATURATION: 98 % | WEIGHT: 130 LBS | HEIGHT: 63 IN

## 2025-04-04 DIAGNOSIS — Z76.0 MEDICATION REFILL: ICD-10-CM

## 2025-04-04 DIAGNOSIS — R51.9 ACUTE NONINTRACTABLE HEADACHE, UNSPECIFIED HEADACHE TYPE: Primary | ICD-10-CM

## 2025-04-04 PROCEDURE — 99283 EMERGENCY DEPT VISIT LOW MDM: CPT

## 2025-04-04 RX ORDER — OXYCODONE AND ACETAMINOPHEN 5; 325 MG/1; MG/1
1 TABLET ORAL EVERY 6 HOURS PRN
Qty: 12 TABLET | Refills: 0 | Status: SHIPPED | OUTPATIENT
Start: 2025-04-04 | End: 2025-04-07

## 2025-04-04 ASSESSMENT — PAIN DESCRIPTION - PAIN TYPE: TYPE: ACUTE PAIN

## 2025-04-04 ASSESSMENT — PAIN SCALES - GENERAL: PAINLEVEL_OUTOF10: 7

## 2025-04-04 ASSESSMENT — COLUMBIA-SUICIDE SEVERITY RATING SCALE - C-SSRS
2. HAVE YOU ACTUALLY HAD ANY THOUGHTS OF KILLING YOURSELF?: NO
1. IN THE PAST MONTH, HAVE YOU WISHED YOU WERE DEAD OR WISHED YOU COULD GO TO SLEEP AND NOT WAKE UP?: NO
6. HAVE YOU EVER DONE ANYTHING, STARTED TO DO ANYTHING, OR PREPARED TO DO ANYTHING TO END YOUR LIFE?: NO

## 2025-04-04 ASSESSMENT — PAIN DESCRIPTION - LOCATION: LOCATION: HEAD

## 2025-04-04 ASSESSMENT — PAIN - FUNCTIONAL ASSESSMENT: PAIN_FUNCTIONAL_ASSESSMENT: 0-10

## 2025-04-04 NOTE — ED PROVIDER NOTES
HPI   Chief Complaint   Patient presents with    Headache       This 69-year-old female who complains of a headache she is also interested in medication refill essentially she has had migraines for years this was diagnosed by her primary care she has a prescription that is 2 years old for oxycodone to take sparingly if she develops headache she states she has tried other migraine meds and they do not help.  She denies this being the worst headache of her life no altered sensation.  Her primary care doctor is actually on maternity leave so she cannot get a refill.  No nausea no vomiting no other complaints.  Pain at this point is tolerable but uncomfortable              Patient History   Past Medical History:   Diagnosis Date    Arteriosclerosis of coronary artery 2023    10/13/2020: CAC = 1320 Agatston units      Hyperlipidemia 2023    Unable to tolerate simvastatin      Personal history of other diseases of the respiratory system 2020    History of pharyngitis    Personal history of other diseases of the respiratory system 2020    History of acute bronchitis    Personal history of other diseases of the respiratory system 10/24/2017    History of acute bacterial sinusitis    Toxic effect of venom of other arthropod, accidental (unintentional), initial encounter 2017    Local reaction to insect sting     Past Surgical History:   Procedure Laterality Date    MASTECTOMY  2016    Breast Surgery Mastectomy     Family History   Problem Relation Name Age of Onset    Cancer Mother          gallbladder    Liver cancer Mother      Pancreatic cancer Mother      Coronary artery disease Father      Heart attack Father  52    Heart attack Brother       Social History     Tobacco Use    Smoking status: Former     Current packs/day: 0.00     Average packs/day: 1 pack/day for 48.0 years (48.0 ttl pk-yrs)     Types: Cigarettes     Start date:      Quit date:      Years since quittin.2     Smokeless tobacco: Never   Substance Use Topics    Alcohol use: Yes     Comment: Social    Drug use: Never       Physical Exam   ED Triage Vitals   Temperature Heart Rate Respirations BP   04/04/25 1105 04/04/25 1105 04/04/25 1105 04/04/25 1105   37 °C (98.6 °F) 79 18 (!) 130/99      Pulse Ox Temp src Heart Rate Source Patient Position   04/04/25 1105 -- 04/04/25 1149 --   98 %  Monitor       BP Location FiO2 (%)     -- --             Physical Exam  Vitals reviewed.   Constitutional:       General: She is not in acute distress.     Appearance: Normal appearance. She is normal weight. She is not ill-appearing, toxic-appearing or diaphoretic.   HENT:      Head: Normocephalic and atraumatic.      Right Ear: External ear normal.      Left Ear: External ear normal.      Nose: Nose normal.      Mouth/Throat:      Mouth: Mucous membranes are moist.   Eyes:      General:         Right eye: No discharge.         Left eye: No discharge.      Extraocular Movements: Extraocular movements intact.      Conjunctiva/sclera: Conjunctivae normal.      Pupils: Pupils are equal, round, and reactive to light.   Cardiovascular:      Rate and Rhythm: Normal rate and regular rhythm.   Pulmonary:      Effort: Pulmonary effort is normal. No respiratory distress.      Breath sounds: No stridor.   Abdominal:      General: There is no distension.      Tenderness: There is no guarding.   Musculoskeletal:         General: No swelling, tenderness or deformity.      Cervical back: Normal range of motion.   Skin:     General: Skin is warm.      Capillary Refill: Capillary refill takes less than 2 seconds.      Coloration: Skin is not jaundiced.      Findings: No bruising or rash.   Neurological:      General: No focal deficit present.      Mental Status: She is alert and oriented to person, place, and time. Mental status is at baseline.   Psychiatric:         Mood and Affect: Mood normal.         Behavior: Behavior normal.         Thought  Content: Thought content normal.         Judgment: Judgment normal.           ED Course & MDM   Diagnoses as of 04/04/25 1849   Acute nonintractable headache, unspecified headache type   Medication refill                 No data recorded     Damien Coma Scale Score: 15 (04/04/25 1111 : Alayna Jolly RN)                           Medical Decision Making  Differential diagnosis of chronic headache disorder, migraine, ICH, CVA    Patient appears well her neurologic exam is unremarkable her OARRS reviewed shows no prescriptions even in the last year or 2    I feel comfortable prescribing her the oxycodone that has helped her so much in the past especially with her OARRS proven Limited use.  She is driving so prefers prescription understands return precautions    Escalation of care considered: I considered admission, however given the improvement in symptoms and reassuring workup, patient is appropriate for discharge and outpatient care. Risk of hospitalization outweighs the benefits. Pt is resting comfortably, well hydrated, tolerating PO, normal neuro exam, lungs CTA, ab soft NTND, not requiring supplemental O2/supportive care/IV medications or IVF, etc.   Ambulating [well/at baseline].  Do not suspect life threatening condition at this time.    Shared Decision made with pt who agrees with plan          Procedure  Procedures     Geoffrey Gallegos PA-C  04/04/25 5050

## 2025-04-04 NOTE — ED TRIAGE NOTES
Patient has had a migraine since Monday, normally takes oxycodone for the migraine, patients doctor is at wedding so she came to the ER instead.

## 2025-04-16 ENCOUNTER — APPOINTMENT (OUTPATIENT)
Dept: PRIMARY CARE | Facility: CLINIC | Age: 70
End: 2025-04-16
Payer: MEDICARE

## 2025-04-16 VITALS
HEIGHT: 62 IN | RESPIRATION RATE: 12 BRPM | SYSTOLIC BLOOD PRESSURE: 125 MMHG | BODY MASS INDEX: 25.73 KG/M2 | DIASTOLIC BLOOD PRESSURE: 79 MMHG | HEART RATE: 65 BPM | OXYGEN SATURATION: 94 % | WEIGHT: 139.8 LBS

## 2025-04-16 DIAGNOSIS — G43.909 MIGRAINE WITHOUT STATUS MIGRAINOSUS, NOT INTRACTABLE, UNSPECIFIED MIGRAINE TYPE: ICD-10-CM

## 2025-04-16 DIAGNOSIS — Z78.0 MENOPAUSE PRESENT: ICD-10-CM

## 2025-04-16 DIAGNOSIS — Z51.81 THERAPEUTIC DRUG MONITORING: ICD-10-CM

## 2025-04-16 DIAGNOSIS — Z12.31 ENCOUNTER FOR SCREENING MAMMOGRAM FOR MALIGNANT NEOPLASM OF BREAST: ICD-10-CM

## 2025-04-16 DIAGNOSIS — I25.10 CORONARY ARTERY DISEASE, UNSPECIFIED VESSEL OR LESION TYPE, UNSPECIFIED WHETHER ANGINA PRESENT, UNSPECIFIED WHETHER NATIVE OR TRANSPLANTED HEART: ICD-10-CM

## 2025-04-16 DIAGNOSIS — R21 RASH: ICD-10-CM

## 2025-04-16 DIAGNOSIS — Z71.89 GOALS OF CARE, COUNSELING/DISCUSSION: ICD-10-CM

## 2025-04-16 DIAGNOSIS — Z00.00 MEDICARE ANNUAL WELLNESS VISIT, SUBSEQUENT: Primary | ICD-10-CM

## 2025-04-16 PROCEDURE — 1158F ADVNC CARE PLAN TLK DOCD: CPT | Performed by: INTERNAL MEDICINE

## 2025-04-16 PROCEDURE — 99397 PER PM REEVAL EST PAT 65+ YR: CPT | Performed by: INTERNAL MEDICINE

## 2025-04-16 PROCEDURE — 1159F MED LIST DOCD IN RCRD: CPT | Performed by: INTERNAL MEDICINE

## 2025-04-16 PROCEDURE — 1123F ACP DISCUSS/DSCN MKR DOCD: CPT | Performed by: INTERNAL MEDICINE

## 2025-04-16 PROCEDURE — 1036F TOBACCO NON-USER: CPT | Performed by: INTERNAL MEDICINE

## 2025-04-16 PROCEDURE — 1170F FXNL STATUS ASSESSED: CPT | Performed by: INTERNAL MEDICINE

## 2025-04-16 PROCEDURE — 3008F BODY MASS INDEX DOCD: CPT | Performed by: INTERNAL MEDICINE

## 2025-04-16 PROCEDURE — G0439 PPPS, SUBSEQ VISIT: HCPCS | Performed by: INTERNAL MEDICINE

## 2025-04-16 RX ORDER — OXYCODONE AND ACETAMINOPHEN 5; 325 MG/1; MG/1
1 TABLET ORAL EVERY 4 HOURS PRN
COMMUNITY

## 2025-04-16 RX ORDER — PREDNISONE 20 MG/1
TABLET ORAL
Qty: 18 TABLET | Refills: 0 | Status: SHIPPED | OUTPATIENT
Start: 2025-04-16

## 2025-04-16 ASSESSMENT — ACTIVITIES OF DAILY LIVING (ADL)
GROCERY_SHOPPING: INDEPENDENT
DRESSING: INDEPENDENT
TAKING_MEDICATION: INDEPENDENT
BATHING: INDEPENDENT
DOING_HOUSEWORK: INDEPENDENT
MANAGING_FINANCES: INDEPENDENT

## 2025-04-16 ASSESSMENT — ENCOUNTER SYMPTOMS
CONSTIPATION: 0
SHORTNESS OF BREATH: 0
OCCASIONAL FEELINGS OF UNSTEADINESS: 0
BLOOD IN STOOL: 0
LOSS OF SENSATION IN FEET: 0
DIZZINESS: 0
HEADACHES: 1
FATIGUE: 0
DEPRESSION: 0
SLEEP DISTURBANCE: 0

## 2025-04-16 ASSESSMENT — PATIENT HEALTH QUESTIONNAIRE - PHQ9
SUM OF ALL RESPONSES TO PHQ9 QUESTIONS 1 AND 2: 0
1. LITTLE INTEREST OR PLEASURE IN DOING THINGS: NOT AT ALL
2. FEELING DOWN, DEPRESSED OR HOPELESS: NOT AT ALL

## 2025-04-16 NOTE — PROGRESS NOTES
"Subjective   Reason for Visit: Vida Jose is an 69 y.o. female here for a Medicare Wellness visit.     Past Medical, Surgical, and Family History reviewed and updated in chart.    Reviewed all medications by prescribing practitioner or clinical pharmacist (such as prescriptions, OTCs, herbal therapies and supplements) and documented in the medical record.    Pt presents for MAW.    Walks for an hour Mon-Fri. Also golfs and bowls.    Had a migraine while I was on leave. Was able to get percocet script from ER. Normally gets them around once a month. This one lasted a few days.        Patient Care Team:  Nader Peterson MD as PCP - General (Internal Medicine)  Rush Byrne MD as Consulting Physician (Cardiology)     Pt is not considered to be at high risk of opioid abuse after reviewing chart.    Review of Systems   Constitutional:  Negative for fatigue.   Respiratory:  Negative for shortness of breath.    Cardiovascular:  Negative for chest pain.   Gastrointestinal:  Negative for blood in stool and constipation.   Neurological:  Positive for headaches. Negative for dizziness.   Psychiatric/Behavioral:  Negative for sleep disturbance.        Objective   Vitals:  /79 (BP Location: Left arm, Patient Position: Sitting)   Pulse 65   Resp 12   Ht 1.582 m (5' 2.3\")   Wt 63.4 kg (139 lb 12.8 oz)   SpO2 94%   BMI 25.32 kg/m²       Physical Exam  Constitutional:       General: She is not in acute distress.     Appearance: She is not ill-appearing, toxic-appearing or diaphoretic.   HENT:      Head: Normocephalic and atraumatic.   Eyes:      Conjunctiva/sclera: Conjunctivae normal.   Cardiovascular:      Rate and Rhythm: Normal rate and regular rhythm.      Heart sounds: No murmur heard.     No friction rub. No gallop.   Pulmonary:      Effort: Pulmonary effort is normal. No respiratory distress.      Breath sounds: No stridor. No wheezing, rhonchi or rales.   Abdominal:      General: Abdomen is flat. Bowel sounds " are normal. There is no distension.      Palpations: Abdomen is soft.      Tenderness: There is no abdominal tenderness. There is no guarding.   Neurological:      Mental Status: She is alert.         Assessment/Plan   Assessment & Plan  Medicare annual wellness visit, subsequent         Goals of care, counseling/discussion         Encounter for screening mammogram for malignant neoplasm of breast  -Order placed.  Orders:    BI mammo bilateral screening tomosynthesis; Future    Menopause present  -Has never had DEXA scan done. While pt walks extensively, she also has long smoking hx so will get scan done.  Orders:    XR DEXA bone density; Future    Coronary artery disease, unspecified vessel or lesion type, unspecified whether angina present, unspecified whether native or transplanted heart  -Labwork as above.  Orders:    Comprehensive metabolic panel; Future    CBC; Future    Lipid panel; Future    Migraine without status migrainosus, not intractable, unspecified migraine type  -Usually takes 1 percocet a month when this flares up.  -Didn't have time lat visit to do consent for this. Done today. UDS to be obtained. Will refill as needed (just got from ED but pt will reach out if she needs it again otherwise).       Therapeutic drug monitoring    Orders:    Drug Screen, Urine With Reflex to Confirmation; Future    Rash  -Gets heat rashes occasionally. Takes prednisone which resolves it. Order placed since she is going to warmer weather next week.  Orders:    predniSONE (Deltasone) 20 mg tablet; Take 3 tabs po qday x 3 days, then 2 tabs po qday x 3 days, then 1 tab po qday x 3 days then stop.    -Encouraged pt to consider shingles and TDAP vaccine.  -Reviewed colonoscopy results w/ pt. To repeat in 3 years.  -Confirmed with pt that she already has 6 month CT scheduled for lung nodule in June.    Advance Directives Discussion  Advanced Care Planning (including a Living Will, Healthcare POA, as well as specific end of  life choices and/or directives), was discussed with the patient and/or surrogate, voluntarily, and details of that discussion documented in the Problem List (under Advanced Directives Discussion) of the medical record.  Pt doesn't have a living will or HCPOA. Understands that after her , her 4 children would be her NOK. Pt confirms she wants to be full code.   (~16 min spent discussing above)

## 2025-04-17 LAB
ALBUMIN SERPL-MCNC: 4 G/DL (ref 3.6–5.1)
ALP SERPL-CCNC: 83 U/L (ref 37–153)
ALT SERPL-CCNC: 47 U/L (ref 6–29)
ANION GAP SERPL CALCULATED.4IONS-SCNC: 8 MMOL/L (CALC) (ref 7–17)
AST SERPL-CCNC: 24 U/L (ref 10–35)
BILIRUB SERPL-MCNC: 0.6 MG/DL (ref 0.2–1.2)
BUN SERPL-MCNC: 14 MG/DL (ref 7–25)
CALCIUM SERPL-MCNC: 9 MG/DL (ref 8.6–10.4)
CHLORIDE SERPL-SCNC: 110 MMOL/L (ref 98–110)
CHOLEST SERPL-MCNC: 134 MG/DL
CHOLEST/HDLC SERPL: 2.2 (CALC)
CO2 SERPL-SCNC: 26 MMOL/L (ref 20–32)
CREAT SERPL-MCNC: 0.8 MG/DL (ref 0.6–1)
EGFRCR SERPLBLD CKD-EPI 2021: 79 ML/MIN/1.73M2
ERYTHROCYTE [DISTWIDTH] IN BLOOD BY AUTOMATED COUNT: 13.2 % (ref 11–15)
GLUCOSE SERPL-MCNC: 94 MG/DL (ref 65–99)
HCT VFR BLD AUTO: 42.1 % (ref 35–45)
HDLC SERPL-MCNC: 62 MG/DL
HGB BLD-MCNC: 13.6 G/DL (ref 11.7–15.5)
LDLC SERPL CALC-MCNC: 55 MG/DL (CALC)
MCH RBC QN AUTO: 30.4 PG (ref 27–33)
MCHC RBC AUTO-ENTMCNC: 32.3 G/DL (ref 32–36)
MCV RBC AUTO: 94 FL (ref 80–100)
NONHDLC SERPL-MCNC: 72 MG/DL (CALC)
PLATELET # BLD AUTO: 209 THOUSAND/UL (ref 140–400)
PMV BLD REES-ECKER: 10.4 FL (ref 7.5–12.5)
POTASSIUM SERPL-SCNC: 4.2 MMOL/L (ref 3.5–5.3)
PROT SERPL-MCNC: 6 G/DL (ref 6.1–8.1)
RBC # BLD AUTO: 4.48 MILLION/UL (ref 3.8–5.1)
SODIUM SERPL-SCNC: 144 MMOL/L (ref 135–146)
TRIGL SERPL-MCNC: 86 MG/DL
WBC # BLD AUTO: 5.9 THOUSAND/UL (ref 3.8–10.8)

## 2025-04-19 LAB
1OH-MIDAZOLAM UR-MCNC: NEGATIVE NG/ML
7AMINOCLONAZEPAM UR-MCNC: NEGATIVE NG/ML
A-OH ALPRAZ UR-MCNC: NEGATIVE NG/ML
A-OH-TRIAZOLAM UR-MCNC: NEGATIVE NG/ML
AMPHETAMINES UR QL: NEGATIVE NG/ML
BARBITURATES UR QL: NEGATIVE NG/ML
BENZODIAZ UR QL: POSITIVE NG/ML
BZE UR QL: NEGATIVE NG/ML
CREAT UR-MCNC: 75.1 MG/DL
DRUG SCREEN COMMENT UR-IMP: ABNORMAL
FENTANYL UR QL SCN: NEGATIVE NG/ML
LORAZEPAM UR-MCNC: NEGATIVE NG/ML
METHADONE UR QL: NEGATIVE NG/ML
NORDIAZEPAM UR-MCNC: NEGATIVE NG/ML
OH-ETHYLFLURAZ UR-MCNC: NEGATIVE NG/ML
OPIATES UR QL: NEGATIVE NG/ML
OXAZEPAM UR-MCNC: 56 NG/ML
OXIDANTS UR QL: NEGATIVE MCG/ML
OXYCODONE UR QL: NEGATIVE NG/ML
PCP UR QL: NEGATIVE NG/ML
PH UR: 7.7 [PH] (ref 4.5–9)
QUEST NOTES AND COMMENTS: ABNORMAL
TEMAZEPAM UR-MCNC: NEGATIVE NG/ML
THC UR QL: NEGATIVE NG/ML

## 2025-05-07 ENCOUNTER — HOSPITAL ENCOUNTER (OUTPATIENT)
Dept: RADIOLOGY | Facility: CLINIC | Age: 70
Discharge: HOME | End: 2025-05-07
Payer: MEDICARE

## 2025-05-07 DIAGNOSIS — Z12.31 ENCOUNTER FOR SCREENING MAMMOGRAM FOR MALIGNANT NEOPLASM OF BREAST: ICD-10-CM

## 2025-05-07 DIAGNOSIS — Z78.0 MENOPAUSE PRESENT: ICD-10-CM

## 2025-05-07 PROCEDURE — 77063 BREAST TOMOSYNTHESIS BI: CPT | Mod: LEFT SIDE | Performed by: RADIOLOGY

## 2025-05-07 PROCEDURE — 77080 DXA BONE DENSITY AXIAL: CPT | Performed by: RADIOLOGY

## 2025-05-07 PROCEDURE — 77067 SCR MAMMO BI INCL CAD: CPT | Mod: LEFT SIDE | Performed by: RADIOLOGY

## 2025-05-07 PROCEDURE — 77080 DXA BONE DENSITY AXIAL: CPT

## 2025-05-07 PROCEDURE — 77067 SCR MAMMO BI INCL CAD: CPT | Mod: 52,LT

## 2025-05-08 ENCOUNTER — TELEPHONE (OUTPATIENT)
Dept: PRIMARY CARE | Facility: CLINIC | Age: 70
End: 2025-05-08
Payer: MEDICARE

## 2025-06-26 DIAGNOSIS — Z79.1 LONG TERM (CURRENT) USE OF NON-STEROIDAL ANTI-INFLAMMATORIES (NSAID): ICD-10-CM

## 2025-06-26 RX ORDER — PANTOPRAZOLE SODIUM 20 MG/1
20 TABLET, DELAYED RELEASE ORAL
Qty: 90 TABLET | Refills: 3 | Status: SHIPPED | OUTPATIENT
Start: 2025-06-26

## 2025-06-30 ENCOUNTER — HOSPITAL ENCOUNTER (OUTPATIENT)
Dept: RADIOLOGY | Facility: CLINIC | Age: 70
Discharge: HOME | End: 2025-06-30
Payer: MEDICARE

## 2025-06-30 DIAGNOSIS — R91.8 LUNG NODULES: ICD-10-CM

## 2025-06-30 PROCEDURE — 76380 CAT SCAN FOLLOW-UP STUDY: CPT | Performed by: RADIOLOGY

## 2025-06-30 PROCEDURE — 71250 CT THORAX DX C-: CPT

## 2025-07-01 ENCOUNTER — TELEPHONE (OUTPATIENT)
Dept: PRIMARY CARE | Facility: CLINIC | Age: 70
End: 2025-07-01
Payer: MEDICARE

## 2025-07-01 DIAGNOSIS — M15.9 GENERALIZED OSTEOARTHRITIS: ICD-10-CM

## 2025-07-01 DIAGNOSIS — R21 RASH: ICD-10-CM

## 2025-07-01 RX ORDER — CYCLOBENZAPRINE HCL 10 MG
10 TABLET ORAL AS NEEDED
Qty: 30 TABLET | Refills: 2 | Status: SHIPPED | OUTPATIENT
Start: 2025-07-01 | End: 2026-07-01

## 2025-07-01 RX ORDER — PREDNISONE 20 MG/1
TABLET ORAL
Qty: 18 TABLET | Refills: 0 | Status: SHIPPED | OUTPATIENT
Start: 2025-07-01

## 2025-07-15 DIAGNOSIS — E78.5 HYPERLIPIDEMIA, UNSPECIFIED HYPERLIPIDEMIA TYPE: ICD-10-CM

## 2025-07-15 RX ORDER — ROSUVASTATIN CALCIUM 40 MG/1
40 TABLET, COATED ORAL DAILY
Qty: 90 TABLET | Refills: 3 | Status: SHIPPED | OUTPATIENT
Start: 2025-07-15

## 2025-07-23 DIAGNOSIS — B00.1 RECURRENT COLD SORES: ICD-10-CM

## 2025-07-23 RX ORDER — ACYCLOVIR 400 MG/1
400 TABLET ORAL DAILY
Qty: 90 TABLET | Refills: 3 | Status: SHIPPED | OUTPATIENT
Start: 2025-07-23

## 2025-11-12 ENCOUNTER — APPOINTMENT (OUTPATIENT)
Dept: CARDIOLOGY | Facility: CLINIC | Age: 70
End: 2025-11-12
Payer: MEDICARE

## 2026-04-20 ENCOUNTER — APPOINTMENT (OUTPATIENT)
Dept: PRIMARY CARE | Facility: CLINIC | Age: 71
End: 2026-04-20
Payer: MEDICARE